# Patient Record
Sex: FEMALE | Race: BLACK OR AFRICAN AMERICAN | URBAN - METROPOLITAN AREA
[De-identification: names, ages, dates, MRNs, and addresses within clinical notes are randomized per-mention and may not be internally consistent; named-entity substitution may affect disease eponyms.]

---

## 2017-03-10 ENCOUNTER — EMERGENCY (EMERGENCY)
Facility: HOSPITAL | Age: 22
LOS: 1 days | End: 2017-03-10
Payer: MEDICAID

## 2017-03-10 PROCEDURE — 99284 EMERGENCY DEPT VISIT MOD MDM: CPT

## 2017-05-13 ENCOUNTER — EMERGENCY (EMERGENCY)
Facility: HOSPITAL | Age: 22
LOS: 1 days | End: 2017-05-13
Payer: MEDICAID

## 2017-05-13 PROCEDURE — 99284 EMERGENCY DEPT VISIT MOD MDM: CPT

## 2017-05-14 PROCEDURE — 71020: CPT | Mod: 26

## 2017-05-15 ENCOUNTER — EMERGENCY (EMERGENCY)
Facility: HOSPITAL | Age: 22
LOS: 1 days | End: 2017-05-15
Payer: MEDICAID

## 2017-05-15 PROCEDURE — 99284 EMERGENCY DEPT VISIT MOD MDM: CPT

## 2017-05-18 ENCOUNTER — EMERGENCY (EMERGENCY)
Facility: HOSPITAL | Age: 22
LOS: 1 days | End: 2017-05-18
Payer: MEDICAID

## 2017-05-18 PROCEDURE — 99283 EMERGENCY DEPT VISIT LOW MDM: CPT

## 2017-05-31 ENCOUNTER — EMERGENCY (EMERGENCY)
Facility: HOSPITAL | Age: 22
LOS: 1 days | End: 2017-05-31
Payer: MEDICAID

## 2017-05-31 PROCEDURE — 99283 EMERGENCY DEPT VISIT LOW MDM: CPT

## 2017-06-01 ENCOUNTER — EMERGENCY (EMERGENCY)
Facility: HOSPITAL | Age: 22
LOS: 1 days | End: 2017-06-01
Payer: MEDICAID

## 2017-06-01 PROCEDURE — 71020: CPT | Mod: 26

## 2017-06-01 PROCEDURE — 99284 EMERGENCY DEPT VISIT MOD MDM: CPT

## 2017-06-08 ENCOUNTER — EMERGENCY (EMERGENCY)
Facility: HOSPITAL | Age: 22
LOS: 1 days | End: 2017-06-08
Payer: MEDICAID

## 2017-06-08 PROCEDURE — 71020: CPT | Mod: 26

## 2017-06-08 PROCEDURE — 99284 EMERGENCY DEPT VISIT MOD MDM: CPT

## 2017-07-01 ENCOUNTER — EMERGENCY (EMERGENCY)
Facility: HOSPITAL | Age: 22
LOS: 1 days | End: 2017-07-01
Payer: MEDICAID

## 2017-07-01 PROCEDURE — 99284 EMERGENCY DEPT VISIT MOD MDM: CPT

## 2017-07-01 PROCEDURE — 71020: CPT | Mod: 26

## 2017-08-04 ENCOUNTER — EMERGENCY (EMERGENCY)
Facility: HOSPITAL | Age: 22
LOS: 1 days | End: 2017-08-04
Payer: MEDICAID

## 2017-08-04 PROCEDURE — 99283 EMERGENCY DEPT VISIT LOW MDM: CPT

## 2017-09-05 ENCOUNTER — EMERGENCY (EMERGENCY)
Facility: HOSPITAL | Age: 22
LOS: 1 days | End: 2017-09-05
Payer: MEDICAID

## 2017-09-05 PROCEDURE — 72050 X-RAY EXAM NECK SPINE 4/5VWS: CPT | Mod: 26

## 2017-09-05 PROCEDURE — 99284 EMERGENCY DEPT VISIT MOD MDM: CPT

## 2017-09-20 ENCOUNTER — EMERGENCY (EMERGENCY)
Facility: HOSPITAL | Age: 22
LOS: 1 days | End: 2017-09-20
Payer: MEDICAID

## 2017-09-20 PROCEDURE — 99282 EMERGENCY DEPT VISIT SF MDM: CPT

## 2017-10-03 ENCOUNTER — EMERGENCY (EMERGENCY)
Facility: HOSPITAL | Age: 22
LOS: 1 days | End: 2017-10-03
Payer: MEDICAID

## 2017-10-03 PROCEDURE — 99284 EMERGENCY DEPT VISIT MOD MDM: CPT | Mod: 25

## 2017-10-18 ENCOUNTER — EMERGENCY (EMERGENCY)
Facility: HOSPITAL | Age: 22
LOS: 1 days | Discharge: HOME | End: 2017-10-18
Attending: EMERGENCY MEDICINE
Payer: MEDICAID

## 2017-10-18 VITALS
TEMPERATURE: 99 F | HEIGHT: 67 IN | RESPIRATION RATE: 16 BRPM | SYSTOLIC BLOOD PRESSURE: 144 MMHG | HEART RATE: 94 BPM | OXYGEN SATURATION: 97 % | DIASTOLIC BLOOD PRESSURE: 86 MMHG | WEIGHT: 199.96 LBS

## 2017-10-18 DIAGNOSIS — F25.0 SCHIZOAFFECTIVE DISORDER, BIPOLAR TYPE: ICD-10-CM

## 2017-10-18 LAB
ALBUMIN SERPL ELPH-MCNC: 4 G/DL — SIGNIFICANT CHANGE UP (ref 3.3–5.2)
ALP SERPL-CCNC: 58 U/L — SIGNIFICANT CHANGE UP (ref 40–120)
ALT FLD-CCNC: 10 U/L — SIGNIFICANT CHANGE UP
AMPHET UR-MCNC: NEGATIVE — SIGNIFICANT CHANGE UP
ANION GAP SERPL CALC-SCNC: 12 MMOL/L — SIGNIFICANT CHANGE UP (ref 5–17)
APAP SERPL-MCNC: <7.5 UG/ML — LOW (ref 10–26)
APPEARANCE UR: CLEAR — SIGNIFICANT CHANGE UP
AST SERPL-CCNC: 19 U/L — SIGNIFICANT CHANGE UP
BACTERIA # UR AUTO: ABNORMAL
BARBITURATES UR SCN-MCNC: NEGATIVE — SIGNIFICANT CHANGE UP
BASOPHILS NFR BLD AUTO: 1 % — SIGNIFICANT CHANGE UP (ref 0–2)
BENZODIAZ UR-MCNC: NEGATIVE — SIGNIFICANT CHANGE UP
BILIRUB SERPL-MCNC: 0.2 MG/DL — LOW (ref 0.4–2)
BILIRUB UR-MCNC: ABNORMAL
BUN SERPL-MCNC: 9 MG/DL — SIGNIFICANT CHANGE UP (ref 8–20)
CALCIUM SERPL-MCNC: 9.2 MG/DL — SIGNIFICANT CHANGE UP (ref 8.6–10.2)
CHLORIDE SERPL-SCNC: 102 MMOL/L — SIGNIFICANT CHANGE UP (ref 98–107)
CO2 SERPL-SCNC: 24 MMOL/L — SIGNIFICANT CHANGE UP (ref 22–29)
COCAINE METAB.OTHER UR-MCNC: NEGATIVE — SIGNIFICANT CHANGE UP
COD CRY URNS QL: ABNORMAL
COLOR SPEC: YELLOW — SIGNIFICANT CHANGE UP
CREAT SERPL-MCNC: 0.91 MG/DL — SIGNIFICANT CHANGE UP (ref 0.5–1.3)
DIFF PNL FLD: NEGATIVE — SIGNIFICANT CHANGE UP
EOSINOPHIL NFR BLD AUTO: 1 % — SIGNIFICANT CHANGE UP (ref 0–5)
EPI CELLS # UR: SIGNIFICANT CHANGE UP
ETHANOL SERPL-MCNC: <10 MG/DL — SIGNIFICANT CHANGE UP
GLUCOSE SERPL-MCNC: 89 MG/DL — SIGNIFICANT CHANGE UP (ref 70–115)
GLUCOSE UR QL: NEGATIVE MG/DL — SIGNIFICANT CHANGE UP
HCT VFR BLD CALC: 39.8 % — SIGNIFICANT CHANGE UP (ref 37–47)
HGB BLD-MCNC: 12.8 G/DL — SIGNIFICANT CHANGE UP (ref 12–16)
KETONES UR-MCNC: ABNORMAL
LEUKOCYTE ESTERASE UR-ACNC: ABNORMAL
LYMPHOCYTES # BLD AUTO: 51 % — SIGNIFICANT CHANGE UP (ref 20–55)
MCHC RBC-ENTMCNC: 28.1 PG — SIGNIFICANT CHANGE UP (ref 27–31)
MCHC RBC-ENTMCNC: 32.2 G/DL — SIGNIFICANT CHANGE UP (ref 32–36)
MCV RBC AUTO: 87.3 FL — SIGNIFICANT CHANGE UP (ref 81–99)
METHADONE UR-MCNC: NEGATIVE — SIGNIFICANT CHANGE UP
MONOCYTES NFR BLD AUTO: 10 % — SIGNIFICANT CHANGE UP (ref 3–10)
NEUTROPHILS NFR BLD AUTO: 37 % — SIGNIFICANT CHANGE UP (ref 37–73)
NITRITE UR-MCNC: NEGATIVE — SIGNIFICANT CHANGE UP
OPIATES UR-MCNC: NEGATIVE — SIGNIFICANT CHANGE UP
PCP SPEC-MCNC: SIGNIFICANT CHANGE UP
PCP UR-MCNC: NEGATIVE — SIGNIFICANT CHANGE UP
PH UR: 6 — SIGNIFICANT CHANGE UP (ref 5–8)
PLAT MORPH BLD: NORMAL — SIGNIFICANT CHANGE UP
PLATELET # BLD AUTO: 194 K/UL — SIGNIFICANT CHANGE UP (ref 150–400)
POTASSIUM SERPL-MCNC: 3.7 MMOL/L — SIGNIFICANT CHANGE UP (ref 3.5–5.3)
POTASSIUM SERPL-SCNC: 3.7 MMOL/L — SIGNIFICANT CHANGE UP (ref 3.5–5.3)
PROT SERPL-MCNC: 7.6 G/DL — SIGNIFICANT CHANGE UP (ref 6.6–8.7)
PROT UR-MCNC: 30 MG/DL
RBC # BLD: 4.56 M/UL — SIGNIFICANT CHANGE UP (ref 4.4–5.2)
RBC # FLD: 13.6 % — SIGNIFICANT CHANGE UP (ref 11–15.6)
RBC BLD AUTO: NORMAL — SIGNIFICANT CHANGE UP
RBC CASTS # UR COMP ASSIST: SIGNIFICANT CHANGE UP /HPF (ref 0–4)
SALICYLATES SERPL-MCNC: <2 MG/DL — LOW (ref 10–20)
SODIUM SERPL-SCNC: 138 MMOL/L — SIGNIFICANT CHANGE UP (ref 135–145)
SP GR SPEC: 1.02 — SIGNIFICANT CHANGE UP (ref 1.01–1.02)
THC UR QL: NEGATIVE — SIGNIFICANT CHANGE UP
UROBILINOGEN FLD QL: 4 MG/DL
WBC # BLD: 10.4 K/UL — SIGNIFICANT CHANGE UP (ref 4.8–10.8)
WBC # FLD AUTO: 10.4 K/UL — SIGNIFICANT CHANGE UP (ref 4.8–10.8)
WBC UR QL: SIGNIFICANT CHANGE UP

## 2017-10-18 PROCEDURE — 93010 ELECTROCARDIOGRAM REPORT: CPT

## 2017-10-18 PROCEDURE — 90792 PSYCH DIAG EVAL W/MED SRVCS: CPT

## 2017-10-18 PROCEDURE — 99283 EMERGENCY DEPT VISIT LOW MDM: CPT

## 2017-10-18 RX ORDER — HALOPERIDOL DECANOATE 100 MG/ML
5 INJECTION INTRAMUSCULAR EVERY 6 HOURS
Qty: 0 | Refills: 0 | Status: DISCONTINUED | OUTPATIENT
Start: 2017-10-18 | End: 2017-10-22

## 2017-10-18 RX ORDER — DIPHENHYDRAMINE HCL 50 MG
50 CAPSULE ORAL EVERY 6 HOURS
Qty: 0 | Refills: 0 | Status: DISCONTINUED | OUTPATIENT
Start: 2017-10-18 | End: 2017-10-22

## 2017-10-18 RX ORDER — DIVALPROEX SODIUM 500 MG/1
500 TABLET, DELAYED RELEASE ORAL
Qty: 0 | Refills: 0 | Status: DISCONTINUED | OUTPATIENT
Start: 2017-10-18 | End: 2017-10-22

## 2017-10-18 RX ORDER — TOPIRAMATE 25 MG
50 TABLET ORAL AT BEDTIME
Qty: 0 | Refills: 0 | Status: DISCONTINUED | OUTPATIENT
Start: 2017-10-18 | End: 2017-10-22

## 2017-10-18 RX ADMIN — Medication 50 MILLIGRAM(S): at 21:34

## 2017-10-18 RX ADMIN — DIVALPROEX SODIUM 500 MILLIGRAM(S): 500 TABLET, DELAYED RELEASE ORAL at 21:34
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
## 2017-10-18 NOTE — ED BEHAVIORAL HEALTH ASSESSMENT NOTE - REASON
patient displayed agitation upon arrival however has since been calm and cooperative in ED after verbal redirection, denies SI /HI does not appear acutely psychotic, mood dysregulation displayed likely due to lack of compliance with medications, unable to confirm decanoate shot but will restart depakote/topiramate, patient also self discharged self from group home and cannot return , would attempt to clarify decanoate dose and administer tomorrow patient displayed agitation upon arrival however has since been calm and cooperative in ED after verbal redirection, denies SI /HI does not appear acutely psychotic, mood dysregulation displayed likely due to lack of compliance with medications, unable to confirm decanoate shot but will restart depakote/topiramate, patient also self discharged self from group home and cannot return, could not reach mother to ask if she can house pt, would attempt to clarify decanoate dose and administer tomorrow
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
## 2017-10-18 NOTE — ED STATDOCS - PHYSICAL EXAMINATION
Psych: Withdrawn. Seems to be distracted by internal stimuli. Depressed mood. Biting self during exam.

## 2017-10-18 NOTE — ED ADULT NURSE NOTE - DISCHARGE TEACHING
return to local ED if symptoms worsen or thoughts to harm self or others develop return to local ED or call 91/EMS if symptoms worsen or thoughts to harm self or others develop
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
## 2017-10-19 VITALS
SYSTOLIC BLOOD PRESSURE: 111 MMHG | DIASTOLIC BLOOD PRESSURE: 74 MMHG | HEART RATE: 88 BPM | RESPIRATION RATE: 18 BRPM | TEMPERATURE: 99 F

## 2017-10-19 PROCEDURE — 80307 DRUG TEST PRSMV CHEM ANLYZR: CPT

## 2017-10-19 PROCEDURE — 85027 COMPLETE CBC AUTOMATED: CPT

## 2017-10-19 PROCEDURE — 36415 COLL VENOUS BLD VENIPUNCTURE: CPT

## 2017-10-19 PROCEDURE — 80053 COMPREHEN METABOLIC PANEL: CPT

## 2017-10-19 PROCEDURE — 99284 EMERGENCY DEPT VISIT MOD MDM: CPT | Mod: 25

## 2017-10-19 PROCEDURE — 81001 URINALYSIS AUTO W/SCOPE: CPT

## 2017-10-19 PROCEDURE — 96372 THER/PROPH/DIAG INJ SC/IM: CPT

## 2017-10-19 PROCEDURE — 93005 ELECTROCARDIOGRAM TRACING: CPT

## 2017-10-19 RX ORDER — HALOPERIDOL DECANOATE 100 MG/ML
200 INJECTION INTRAMUSCULAR ONCE
Qty: 0 | Refills: 0 | Status: COMPLETED | OUTPATIENT
Start: 2017-10-19 | End: 2017-10-19

## 2017-10-19 RX ORDER — HALOPERIDOL DECANOATE 100 MG/ML
1 INJECTION INTRAMUSCULAR
Qty: 0 | Refills: 0 | COMMUNITY

## 2017-10-19 RX ADMIN — DIVALPROEX SODIUM 500 MILLIGRAM(S): 500 TABLET, DELAYED RELEASE ORAL at 06:08

## 2017-10-19 RX ADMIN — HALOPERIDOL DECANOATE 200 MILLIGRAM(S): 100 INJECTION INTRAMUSCULAR at 11:13

## 2017-10-19 NOTE — ED BEHAVIORAL HEALTH NOTE - BEHAVIORAL HEALTH NOTE
information received from [facility] indicating patient overdue for her Haldol decanoate 200 mg IM last given 9/11/17 She is agreeable to take that medication today  Patient is denying active suicidal or violent urges She is presently calm and cooperative Ms Holguin from  has been in contact with [facility] and [facility] attempting to explore options for patient who discharged from her group home when she left for [place]. Currently not exhibiting symptoms that would benefit from acute level of care.

## 2017-10-19 NOTE — ED ADULT NURSE REASSESSMENT NOTE - COMFORT CARE
repositioned/darkened lights
repositioned/darkened lights/po fluids offered/warm blanket provided
plan of care explained
plan of care explained/darkened lights

## 2017-10-19 NOTE — ED PROVIDER NOTE - CARE PLAN
Principal Discharge DX:	Bipolar disorder  Secondary Diagnosis:	Schizoaffective disorder, bipolar type

## 2017-10-19 NOTE — ED ADULT NURSE REASSESSMENT NOTE - NS ED NURSE REASSESS COMMENT FT1
Pt currently resting/sleeping comfortably. Pt in no apparent distress. Safety maintained. Will continue to monitor the pt for safety.
Assumed care of pt @2300. Pt denies any current SI/HI. pt currently resting/sleeping comfortably. Will monitor the pt for safety.
Assumed care at 0730.  Patient resting in bed, mood is subdued, no attempts to harm self or others, and safety maintained.
Patient mood is subdued, denies suicidal or homicidal ideation, no psychotic symptoms, verbalizing she is motivated to continue treatment in outpatient setting.  Agrees with plan related to pending discharge.  Complaint with ordered medication of Haldol dec 200mg IM given in the right deltoid.

## 2017-10-19 NOTE — ED BEHAVIORAL HEALTH NOTE - BEHAVIORAL HEALTH NOTE
SW Note: Pt was cleared by psychiatry for discharge. Pt is currently homeless. She signed out of her group home, [facility], 10/13/17. Spoke with Emiliano Holguin  from [facility] [phone]. He has no available housing options for pt. he has already reached out to [facility] for potential [facility] housing placement. Crisis residence has no open beds at this time. The pt has no family to reside with. Mr. Holguin aware pt has been cleared and will need to request shelter placement at [facility]. [facility] had no available workers to  pt to bring her to [facility]. Taxi voucher provided. Mr. Holguin requested that pt was given his office number for any issues. pt has her own cellphone. # provided to pt.   Also spoke with Therapist from [facility]/p  clinic, Yoon [phone]. She is aware of the plan. Med sheet faxed prior to discharge and pt was given her Haldol Dec shot which was overdue. Pt signed consent form and psych eval and  [facility] med sheet faxed for continued care.

## 2017-10-19 NOTE — ED ADULT NURSE REASSESSMENT NOTE - GENERAL PATIENT STATE
resting/sleeping/no change observed/comfortable appearance
cooperative/comfortable appearance
resting/sleeping/comfortable appearance/cooperative
resting/sleeping/comfortable appearance/cooperative

## 2017-10-22 ENCOUNTER — EMERGENCY (EMERGENCY)
Facility: HOSPITAL | Age: 22
LOS: 1 days | End: 2017-10-22
Payer: MEDICAID

## 2017-10-22 PROCEDURE — 99284 EMERGENCY DEPT VISIT MOD MDM: CPT

## 2017-10-23 ENCOUNTER — EMERGENCY (EMERGENCY)
Facility: HOSPITAL | Age: 22
LOS: 1 days | Discharge: OTHER INSTITUTION | End: 2017-10-23
Attending: EMERGENCY MEDICINE
Payer: MEDICAID

## 2017-10-23 ENCOUNTER — INPATIENT (INPATIENT)
Facility: HOSPITAL | Age: 22
LOS: 21 days | Discharge: HOME | End: 2017-11-14
Attending: PSYCHIATRY & NEUROLOGY | Admitting: PSYCHIATRY & NEUROLOGY
Payer: MEDICAID

## 2017-10-23 VITALS
DIASTOLIC BLOOD PRESSURE: 88 MMHG | RESPIRATION RATE: 18 BRPM | SYSTOLIC BLOOD PRESSURE: 128 MMHG | TEMPERATURE: 98 F | HEART RATE: 95 BPM | OXYGEN SATURATION: 99 %

## 2017-10-23 VITALS
TEMPERATURE: 99 F | WEIGHT: 197.09 LBS | DIASTOLIC BLOOD PRESSURE: 75 MMHG | OXYGEN SATURATION: 98 % | RESPIRATION RATE: 20 BRPM | HEART RATE: 90 BPM | SYSTOLIC BLOOD PRESSURE: 126 MMHG

## 2017-10-23 VITALS — HEIGHT: 67 IN | WEIGHT: 186.95 LBS | TEMPERATURE: 99 F

## 2017-10-23 DIAGNOSIS — F32.9 MAJOR DEPRESSIVE DISORDER, SINGLE EPISODE, UNSPECIFIED: ICD-10-CM

## 2017-10-23 DIAGNOSIS — R41.83 BORDERLINE INTELLECTUAL FUNCTIONING: ICD-10-CM

## 2017-10-23 LAB
ALBUMIN SERPL ELPH-MCNC: 4 G/DL — SIGNIFICANT CHANGE UP (ref 3.3–5.2)
ALP SERPL-CCNC: 63 U/L — SIGNIFICANT CHANGE UP (ref 40–120)
ALT FLD-CCNC: 8 U/L — SIGNIFICANT CHANGE UP
AMPHET UR-MCNC: NEGATIVE — SIGNIFICANT CHANGE UP
ANION GAP SERPL CALC-SCNC: 19 MMOL/L — HIGH (ref 5–17)
ANISOCYTOSIS BLD QL: SLIGHT — SIGNIFICANT CHANGE UP
APAP SERPL-MCNC: <7.5 UG/ML — LOW (ref 10–26)
APPEARANCE UR: CLEAR — SIGNIFICANT CHANGE UP
AST SERPL-CCNC: 18 U/L — SIGNIFICANT CHANGE UP
BARBITURATES UR SCN-MCNC: NEGATIVE — SIGNIFICANT CHANGE UP
BASOPHILS NFR BLD AUTO: 2 % — SIGNIFICANT CHANGE UP (ref 0–2)
BENZODIAZ UR-MCNC: NEGATIVE — SIGNIFICANT CHANGE UP
BILIRUB SERPL-MCNC: 0.2 MG/DL — LOW (ref 0.4–2)
BILIRUB UR-MCNC: NEGATIVE — SIGNIFICANT CHANGE UP
BUN SERPL-MCNC: 7 MG/DL — LOW (ref 8–20)
BURR CELLS BLD QL SMEAR: PRESENT — SIGNIFICANT CHANGE UP
CALCIUM SERPL-MCNC: 9.8 MG/DL — SIGNIFICANT CHANGE UP (ref 8.6–10.2)
CHLORIDE SERPL-SCNC: 104 MMOL/L — SIGNIFICANT CHANGE UP (ref 98–107)
CO2 SERPL-SCNC: 20 MMOL/L — LOW (ref 22–29)
COCAINE METAB.OTHER UR-MCNC: NEGATIVE — SIGNIFICANT CHANGE UP
COLOR SPEC: YELLOW — SIGNIFICANT CHANGE UP
CREAT SERPL-MCNC: 0.97 MG/DL — SIGNIFICANT CHANGE UP (ref 0.5–1.3)
DACRYOCYTES BLD QL SMEAR: SLIGHT — SIGNIFICANT CHANGE UP
DIFF PNL FLD: NEGATIVE — SIGNIFICANT CHANGE UP
EPI CELLS # UR: SIGNIFICANT CHANGE UP
ETHANOL SERPL-MCNC: <10 MG/DL — SIGNIFICANT CHANGE UP
GLUCOSE SERPL-MCNC: 107 MG/DL — SIGNIFICANT CHANGE UP (ref 70–115)
GLUCOSE UR QL: NEGATIVE MG/DL — SIGNIFICANT CHANGE UP
HCG UR QL: NEGATIVE — SIGNIFICANT CHANGE UP
HCT VFR BLD CALC: 41.2 % — SIGNIFICANT CHANGE UP (ref 37–47)
HGB BLD-MCNC: 13.3 G/DL — SIGNIFICANT CHANGE UP (ref 12–16)
KETONES UR-MCNC: ABNORMAL
LEUKOCYTE ESTERASE UR-ACNC: ABNORMAL
LYMPHOCYTES # BLD AUTO: 44 % — SIGNIFICANT CHANGE UP (ref 20–55)
MACROCYTES BLD QL: SLIGHT — SIGNIFICANT CHANGE UP
MCHC RBC-ENTMCNC: 28.4 PG — SIGNIFICANT CHANGE UP (ref 27–31)
MCHC RBC-ENTMCNC: 32.3 G/DL — SIGNIFICANT CHANGE UP (ref 32–36)
MCV RBC AUTO: 88 FL — SIGNIFICANT CHANGE UP (ref 81–99)
METHADONE UR-MCNC: NEGATIVE — SIGNIFICANT CHANGE UP
MONOCYTES NFR BLD AUTO: 12 % — HIGH (ref 3–10)
NEUTROPHILS NFR BLD AUTO: 41 % — SIGNIFICANT CHANGE UP (ref 37–73)
NITRITE UR-MCNC: NEGATIVE — SIGNIFICANT CHANGE UP
OPIATES UR-MCNC: NEGATIVE — SIGNIFICANT CHANGE UP
PCP SPEC-MCNC: SIGNIFICANT CHANGE UP
PCP UR-MCNC: NEGATIVE — SIGNIFICANT CHANGE UP
PH UR: 6 — SIGNIFICANT CHANGE UP (ref 5–8)
PLAT MORPH BLD: NORMAL — SIGNIFICANT CHANGE UP
PLATELET # BLD AUTO: 264 K/UL — SIGNIFICANT CHANGE UP (ref 150–400)
POIKILOCYTOSIS BLD QL AUTO: SLIGHT — SIGNIFICANT CHANGE UP
POTASSIUM SERPL-MCNC: 4.2 MMOL/L — SIGNIFICANT CHANGE UP (ref 3.5–5.3)
POTASSIUM SERPL-SCNC: 4.2 MMOL/L — SIGNIFICANT CHANGE UP (ref 3.5–5.3)
PROT SERPL-MCNC: 8.1 G/DL — SIGNIFICANT CHANGE UP (ref 6.6–8.7)
PROT UR-MCNC: 15 MG/DL
RBC # BLD: 4.68 M/UL — SIGNIFICANT CHANGE UP (ref 4.4–5.2)
RBC # FLD: 13.7 % — SIGNIFICANT CHANGE UP (ref 11–15.6)
RBC BLD AUTO: ABNORMAL
SALICYLATES SERPL-MCNC: <2 MG/DL — LOW (ref 10–20)
SODIUM SERPL-SCNC: 143 MMOL/L — SIGNIFICANT CHANGE UP (ref 135–145)
SP GR SPEC: 1.03 — HIGH (ref 1.01–1.02)
THC UR QL: NEGATIVE — SIGNIFICANT CHANGE UP
UROBILINOGEN FLD QL: 4 MG/DL
VARIANT LYMPHS # BLD: 1 % — SIGNIFICANT CHANGE UP (ref 0–6)
WBC # BLD: 12 K/UL — HIGH (ref 4.8–10.8)
WBC # FLD AUTO: 12 K/UL — HIGH (ref 4.8–10.8)
WBC UR QL: SIGNIFICANT CHANGE UP

## 2017-10-23 PROCEDURE — 96372 THER/PROPH/DIAG INJ SC/IM: CPT

## 2017-10-23 PROCEDURE — 81001 URINALYSIS AUTO W/SCOPE: CPT

## 2017-10-23 PROCEDURE — 93010 ELECTROCARDIOGRAM REPORT: CPT

## 2017-10-23 PROCEDURE — 99285 EMERGENCY DEPT VISIT HI MDM: CPT

## 2017-10-23 PROCEDURE — 80053 COMPREHEN METABOLIC PANEL: CPT

## 2017-10-23 PROCEDURE — 99285 EMERGENCY DEPT VISIT HI MDM: CPT | Mod: 25

## 2017-10-23 PROCEDURE — 81025 URINE PREGNANCY TEST: CPT

## 2017-10-23 PROCEDURE — 93005 ELECTROCARDIOGRAM TRACING: CPT

## 2017-10-23 PROCEDURE — 85027 COMPLETE CBC AUTOMATED: CPT

## 2017-10-23 PROCEDURE — 36415 COLL VENOUS BLD VENIPUNCTURE: CPT

## 2017-10-23 PROCEDURE — 80307 DRUG TEST PRSMV CHEM ANLYZR: CPT

## 2017-10-23 RX ORDER — HALOPERIDOL DECANOATE 100 MG/ML
5 INJECTION INTRAMUSCULAR EVERY 4 HOURS
Qty: 0 | Refills: 0 | Status: DISCONTINUED | OUTPATIENT
Start: 2017-10-23 | End: 2017-11-14

## 2017-10-23 RX ORDER — METFORMIN HYDROCHLORIDE 850 MG/1
1500 TABLET ORAL DAILY
Qty: 0 | Refills: 0 | Status: DISCONTINUED | OUTPATIENT
Start: 2017-10-23 | End: 2017-11-14

## 2017-10-23 RX ORDER — HALOPERIDOL DECANOATE 100 MG/ML
5 INJECTION INTRAMUSCULAR ONCE
Qty: 0 | Refills: 0 | Status: COMPLETED | OUTPATIENT
Start: 2017-10-23 | End: 2017-10-23

## 2017-10-23 RX ORDER — DIPHENHYDRAMINE HCL 50 MG
50 CAPSULE ORAL EVERY 4 HOURS
Qty: 0 | Refills: 0 | Status: DISCONTINUED | OUTPATIENT
Start: 2017-10-23 | End: 2017-11-14

## 2017-10-23 RX ORDER — HALOPERIDOL DECANOATE 100 MG/ML
10 INJECTION INTRAMUSCULAR AT BEDTIME
Qty: 0 | Refills: 0 | Status: DISCONTINUED | OUTPATIENT
Start: 2017-10-23 | End: 2017-10-26

## 2017-10-23 RX ORDER — ALBUTEROL 90 UG/1
2 AEROSOL, METERED ORAL EVERY 6 HOURS
Qty: 0 | Refills: 0 | Status: DISCONTINUED | OUTPATIENT
Start: 2017-10-23 | End: 2017-11-14

## 2017-10-23 RX ORDER — DIVALPROEX SODIUM 500 MG/1
1000 TABLET, DELAYED RELEASE ORAL AT BEDTIME
Qty: 0 | Refills: 0 | Status: DISCONTINUED | OUTPATIENT
Start: 2017-10-23 | End: 2017-10-24

## 2017-10-23 RX ORDER — TOPIRAMATE 25 MG
200 TABLET ORAL
Qty: 0 | Refills: 0 | Status: DISCONTINUED | OUTPATIENT
Start: 2017-10-23 | End: 2017-11-14

## 2017-10-23 RX ORDER — PROPRANOLOL HCL 160 MG
20 CAPSULE, EXTENDED RELEASE 24HR ORAL THREE TIMES A DAY
Qty: 0 | Refills: 0 | Status: DISCONTINUED | OUTPATIENT
Start: 2017-10-23 | End: 2017-11-14

## 2017-10-23 RX ORDER — HALOPERIDOL DECANOATE 100 MG/ML
5 INJECTION INTRAMUSCULAR ONCE
Qty: 0 | Refills: 0 | Status: DISCONTINUED | OUTPATIENT
Start: 2017-10-23 | End: 2017-11-14

## 2017-10-23 RX ADMIN — Medication 2 MILLIGRAM(S): at 00:40

## 2017-10-23 RX ADMIN — HALOPERIDOL DECANOATE 5 MILLIGRAM(S): 100 INJECTION INTRAMUSCULAR at 00:40

## 2017-10-23 NOTE — ED BEHAVIORAL HEALTH ASSESSMENT NOTE - DESCRIPTION
initially combative   now much calmer  Vital Signs Last 24 Hrs  T(C): 36.9 (23 Oct 2017 08:00), Max: 37.2 (23 Oct 2017 00:18)  T(F): 98.4 (23 Oct 2017 08:00), Max: 98.9 (23 Oct 2017 00:18)  HR: 91 (23 Oct 2017 08:00) (90 - 98)  BP: 108/65 (23 Oct 2017 08:00) (97/59 - 126/75)  BP(mean): --  RR: 20 (23 Oct 2017 08:00) (16 - 20)  SpO2: 98% (23 Oct 2017 08:00) (98% - 99%) asthma HTN lived in group home, multiple psychiatric hospitalizations
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
## 2017-10-23 NOTE — ED BEHAVIORAL HEALTH ASSESSMENT NOTE - SUMMARY
21 year old female with history of mental illness and mild intellectual disability non compliant with psych meds, suicidal threats and aggressive behaviors with poor impulse control Patient a danger to self and others In need of inpatient stabilization

## 2017-10-23 NOTE — ED BEHAVIORAL HEALTH NOTE - BEHAVIORAL HEALTH NOTE
SW Note: pt will be transferred to [facility] for inpt psychiatric tx. Accepting MD, Dr. Michelle. 9.37 legals completed. [facility], [place], [place] and [place] did not have any available beds. Ambulance arranged with [facility]. Ins precert completed. Spoke with Alexandra from [employer] [phone] x [phone]. Auth approved for 4 days 10/23/17 -10/26/17. Auth# [id number]. Review due with Alexandra on 10/26. Info forwarded to [facility] UR dept.   Also left msg's for her therapist at [facility], Yoon León [phone] and her  from [facility] [phone].
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
## 2017-10-23 NOTE — ED ADULT NURSE REASSESSMENT NOTE - NS ED NURSE REASSESS COMMENT FT1
Pt currently sleeping offers no complaints at this present time, pt sedated but arousal, will continue to monitor and maintain safety.
Pt brought to , after she was medicated in the main after being physically combative with staff.  Pt currently sedated, brought to  with security and ANM (M.P.), pt stated she was suicidal wanted to jump in front of a train due to problems she is having, did not disclose any further due to sedation.  Pt integrity maintained, security present, currently no aggressive /assaultive behaviors at this time.  PO fluids offered, tolerated well, snacks offered.    Pt continues to have IV lock to left hand, as per ANM, (M.P), at this time patient will remain with IV lock on as per (M.P), until further reassessment due patients behavior. Will continue to monitor and maintain safety.
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
## 2017-10-23 NOTE — ED PROVIDER NOTE - PHYSICAL EXAMINATION
During the interview the pt is scratching her left wrist and prying bracelet off her wrist. pt is attempting to cut herself with bracelet.

## 2017-10-23 NOTE — ED ADULT NURSE REASSESSMENT NOTE - CONDITION
Pt calmer now, eating and interacting with peers. Medicated with Oxycodone , tylenol and Lyrica. Pt also given artificial tears. Pt continues to be very needy. Is walking better, is friendly and cooperative.  Continues to have difficulty with intensity of light/unchanged Pt calmer now, up for dinner. Interacting with peers, Pt states she wants to change her life and have a small hose. t is hopeful time  at [facility] is a positive one. Pt ambulates to restroom with out difficulty/unchanged

## 2017-10-23 NOTE — ED ADULT NURSE REASSESSMENT NOTE - COMFORT CARE
po fluids offered/warm blanket provided/darkened lights
ambulated to bathroom
plan of care explained/po fluids offered/meal provided
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
## 2017-10-23 NOTE — ED ADULT NURSE NOTE - OBJECTIVE STATEMENT
Pt brought to , stating she was having suicidal thoughts, and plans to throw herself in front of train, pt was medicated in [facility] ED due to combative behavior.
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
## 2017-10-24 PROCEDURE — 90853 GROUP PSYCHOTHERAPY: CPT

## 2017-10-24 PROCEDURE — 99232 SBSQ HOSP IP/OBS MODERATE 35: CPT | Mod: 25

## 2017-10-24 RX ORDER — DIVALPROEX SODIUM 500 MG/1
1000 TABLET, DELAYED RELEASE ORAL AT BEDTIME
Qty: 0 | Refills: 0 | Status: DISCONTINUED | OUTPATIENT
Start: 2017-10-24 | End: 2017-11-14

## 2017-10-24 RX ORDER — DIVALPROEX SODIUM 500 MG/1
500 TABLET, DELAYED RELEASE ORAL AT BEDTIME
Qty: 0 | Refills: 0 | Status: DISCONTINUED | OUTPATIENT
Start: 2017-10-24 | End: 2017-10-24

## 2017-10-24 RX ADMIN — Medication 200 MILLIGRAM(S): at 10:03

## 2017-10-24 RX ADMIN — Medication 20 MILLIGRAM(S): at 13:23

## 2017-10-24 RX ADMIN — Medication 2 MILLIGRAM(S): at 23:10

## 2017-10-24 RX ADMIN — Medication 2 MILLIGRAM(S): at 13:24

## 2017-10-24 RX ADMIN — Medication 20 MILLIGRAM(S): at 21:44

## 2017-10-24 RX ADMIN — Medication 20 MILLIGRAM(S): at 10:03

## 2017-10-24 RX ADMIN — METFORMIN HYDROCHLORIDE 1500 MILLIGRAM(S): 850 TABLET ORAL at 10:03

## 2017-10-24 RX ADMIN — HALOPERIDOL DECANOATE 5 MILLIGRAM(S): 100 INJECTION INTRAMUSCULAR at 18:59

## 2017-10-24 RX ADMIN — HALOPERIDOL DECANOATE 10 MILLIGRAM(S): 100 INJECTION INTRAMUSCULAR at 21:44

## 2017-10-24 RX ADMIN — Medication 50 MILLIGRAM(S): at 18:58

## 2017-10-24 RX ADMIN — Medication 2 MILLIGRAM(S): at 18:59

## 2017-10-24 RX ADMIN — DIVALPROEX SODIUM 1000 MILLIGRAM(S): 500 TABLET, DELAYED RELEASE ORAL at 21:44

## 2017-10-24 NOTE — CHART NOTE - NSCHARTNOTEFT_GEN_A_CORE
Screening Medical Evaluation  Patient Admitted from: [facility] admitting diagnosis: Mood disorder NOS, moderate intellectual disabilities    PAST MEDICAL & SURGICAL HISTORY:  Schizophrenia  Bipolar disorder        Allergies    lithium (Unknown)  Thorazine (Rash)    Intolerances        Social History:     FAMILY HISTORY:      MEDICATIONS  (STANDING):  diVALproex DR 1000 milliGRAM(s) Oral at bedtime  haloperidol     Tablet 10 milliGRAM(s) Oral at bedtime  metFORMIN 1500 milliGRAM(s) Oral daily  propranolol 20 milliGRAM(s) Oral three times a day  topiramate 200 milliGRAM(s) Oral two times a day    MEDICATIONS  (PRN):  ALBUTerol    90 MICROgram(s) HFA Inhaler 2 Puff(s) Inhalation every 6 hours PRN Shortness of Breath and/or Wheezing  diphenhydrAMINE   Capsule 50 milliGRAM(s) Oral every 4 hours PRN Extrapyramidal prophylaxis  diphenhydrAMINE   Injectable 50 milliGRAM(s) IntraMuscular every 4 hours PRN Extrapyramidal prophylaxis  haloperidol     Tablet 5 milliGRAM(s) Oral every 4 hours PRN agitation  haloperidol    Injectable 5 milliGRAM(s) IntraMuscular once PRN agitation  LORazepam     Tablet 2 milliGRAM(s) Oral every 4 hours PRN Agitation  LORazepam   Injectable 2 milliGRAM(s) IntraMuscular once PRN Agitation        CAPILLARY BLOOD GLUCOSE        I/O's Summary      PHYSICAL EXAM:  GENERAL: NAD, well-developed  HEAD:  Atraumatic, Normocephalic  EYES: EOMI, PERRLA, conjunctiva and sclera clear  NECK: Supple, No JVD  CHEST/LUNG: Clear to auscultation bilaterally; No wheeze  HEART: Regular rate and rhythm; No murmurs, rubs, or gallops  ABDOMEN: Soft, Nontender, Nondistended; Bowel sounds present  EXTREMITIES:  2+ Peripheral Pulses, No clubbing, cyanosis, or edema  PSYCH: AAOx3  NEUROLOGY: CN 2-12 intact  SKIN: No rashes or lesions    LABS:                        13.3   12.0  )-----------( 264      ( 23 Oct 2017 01:06 )             41.2     10-    143  |  104  |  7.0<L>  ----------------------------<  107  4.2   |  20.0<L>  |  0.97    Ca    9.8      23 Oct 2017 01:06    TPro  8.1  /  Alb  4.0  /  TBili  0.2<L>  /  DBili  x   /  AST  18  /  ALT  8   /  AlkPhos  63  10-23          Urinalysis Basic - ( 23 Oct 2017 13:53 )    Color: Yellow / Appearance: Clear / S.030 / pH: x  Gluc: x / Ketone: Trace  / Bili: Negative / Urobili: 4 mg/dL   Blood: x / Protein: 15 mg/dL / Nitrite: Negative   Leuk Esterase: Small / RBC: x / WBC 0-2   Sq Epi: x / Non Sq Epi: Occasional / Bacteria: x        RADIOLOGY & ADDITIONAL TESTS:    Assessment and Plan: Patient is a 21 year old woman with PMH of asthma (last attack reported at age 8), intellectual disability and past psychiatric history of schizoaffective disorder, prior psychiatric hospitalizations; admitted from [facility] for uncontrolled emotional lability.  Patient denies any medical complaints or concerns.  Denies lightheadedness/dizziness, chest pain, SOB, n/v/d/c.  Her screening physical was unremarkable, labs WNL.    1) SAD: Continue treatment as per primary psychiatric team; Divalproex DR 1000mg PO qhs, Haloperidol 10mg PO qhs  2) Asthma: Albuterol 90mcg HFA inhaler PRN

## 2017-10-25 PROCEDURE — 99232 SBSQ HOSP IP/OBS MODERATE 35: CPT

## 2017-10-25 RX ORDER — QUETIAPINE FUMARATE 200 MG/1
50 TABLET, FILM COATED ORAL EVERY 6 HOURS
Qty: 0 | Refills: 0 | Status: DISCONTINUED | OUTPATIENT
Start: 2017-10-25 | End: 2017-11-14

## 2017-10-25 RX ADMIN — HALOPERIDOL DECANOATE 5 MILLIGRAM(S): 100 INJECTION INTRAMUSCULAR at 14:09

## 2017-10-25 RX ADMIN — Medication 20 MILLIGRAM(S): at 13:37

## 2017-10-25 RX ADMIN — Medication 200 MILLIGRAM(S): at 00:10

## 2017-10-25 RX ADMIN — Medication 2 MILLIGRAM(S): at 14:08

## 2017-10-25 RX ADMIN — Medication 50 MILLIGRAM(S): at 14:08

## 2017-10-25 RX ADMIN — Medication 20 MILLIGRAM(S): at 20:28

## 2017-10-25 RX ADMIN — HALOPERIDOL DECANOATE 10 MILLIGRAM(S): 100 INJECTION INTRAMUSCULAR at 20:28

## 2017-10-25 RX ADMIN — DIVALPROEX SODIUM 1000 MILLIGRAM(S): 500 TABLET, DELAYED RELEASE ORAL at 20:28

## 2017-10-25 RX ADMIN — METFORMIN HYDROCHLORIDE 1500 MILLIGRAM(S): 850 TABLET ORAL at 10:17

## 2017-10-25 RX ADMIN — Medication 200 MILLIGRAM(S): at 10:17

## 2017-10-25 RX ADMIN — QUETIAPINE FUMARATE 50 MILLIGRAM(S): 200 TABLET, FILM COATED ORAL at 20:30

## 2017-10-25 RX ADMIN — Medication 1 MILLIGRAM(S): at 20:28

## 2017-10-25 RX ADMIN — Medication 200 MILLIGRAM(S): at 20:28

## 2017-10-25 RX ADMIN — Medication 2 MILLIGRAM(S): at 20:30

## 2017-10-25 RX ADMIN — Medication 20 MILLIGRAM(S): at 10:17

## 2017-10-26 PROCEDURE — 99232 SBSQ HOSP IP/OBS MODERATE 35: CPT

## 2017-10-26 RX ORDER — QUETIAPINE FUMARATE 200 MG/1
50 TABLET, FILM COATED ORAL
Qty: 0 | Refills: 0 | Status: DISCONTINUED | OUTPATIENT
Start: 2017-10-26 | End: 2017-11-14

## 2017-10-26 RX ORDER — HALOPERIDOL DECANOATE 100 MG/ML
5 INJECTION INTRAMUSCULAR AT BEDTIME
Qty: 0 | Refills: 0 | Status: DISCONTINUED | OUTPATIENT
Start: 2017-10-26 | End: 2017-10-28

## 2017-10-26 RX ADMIN — METFORMIN HYDROCHLORIDE 1500 MILLIGRAM(S): 850 TABLET ORAL at 10:47

## 2017-10-26 RX ADMIN — HALOPERIDOL DECANOATE 5 MILLIGRAM(S): 100 INJECTION INTRAMUSCULAR at 21:42

## 2017-10-26 RX ADMIN — Medication 20 MILLIGRAM(S): at 10:47

## 2017-10-26 RX ADMIN — HALOPERIDOL DECANOATE 5 MILLIGRAM(S): 100 INJECTION INTRAMUSCULAR at 14:27

## 2017-10-26 RX ADMIN — Medication 200 MILLIGRAM(S): at 10:47

## 2017-10-26 RX ADMIN — Medication 20 MILLIGRAM(S): at 21:42

## 2017-10-26 RX ADMIN — QUETIAPINE FUMARATE 50 MILLIGRAM(S): 200 TABLET, FILM COATED ORAL at 15:26

## 2017-10-26 RX ADMIN — Medication 1 MILLIGRAM(S): at 10:47

## 2017-10-26 RX ADMIN — DIVALPROEX SODIUM 1000 MILLIGRAM(S): 500 TABLET, DELAYED RELEASE ORAL at 21:42

## 2017-10-26 RX ADMIN — Medication 50 MILLIGRAM(S): at 14:27

## 2017-10-26 RX ADMIN — Medication 200 MILLIGRAM(S): at 21:43

## 2017-10-26 RX ADMIN — Medication 1 MILLIGRAM(S): at 21:42

## 2017-10-26 RX ADMIN — Medication 1 MILLIGRAM(S): at 12:20

## 2017-10-26 RX ADMIN — Medication 20 MILLIGRAM(S): at 12:20

## 2017-10-26 RX ADMIN — Medication 2 MILLIGRAM(S): at 15:25

## 2017-10-27 PROCEDURE — 99233 SBSQ HOSP IP/OBS HIGH 50: CPT

## 2017-10-27 RX ADMIN — Medication 200 MILLIGRAM(S): at 09:30

## 2017-10-27 RX ADMIN — Medication 20 MILLIGRAM(S): at 21:16

## 2017-10-27 RX ADMIN — Medication 50 MILLIGRAM(S): at 22:00

## 2017-10-27 RX ADMIN — Medication 2 MILLIGRAM(S): at 22:00

## 2017-10-27 RX ADMIN — HALOPERIDOL DECANOATE 5 MILLIGRAM(S): 100 INJECTION INTRAMUSCULAR at 21:16

## 2017-10-27 RX ADMIN — Medication 1 MILLIGRAM(S): at 11:30

## 2017-10-27 RX ADMIN — QUETIAPINE FUMARATE 50 MILLIGRAM(S): 200 TABLET, FILM COATED ORAL at 09:30

## 2017-10-27 RX ADMIN — METFORMIN HYDROCHLORIDE 1500 MILLIGRAM(S): 850 TABLET ORAL at 09:30

## 2017-10-27 RX ADMIN — QUETIAPINE FUMARATE 50 MILLIGRAM(S): 200 TABLET, FILM COATED ORAL at 13:04

## 2017-10-27 RX ADMIN — Medication 20 MILLIGRAM(S): at 13:04

## 2017-10-27 RX ADMIN — Medication 200 MILLIGRAM(S): at 21:17

## 2017-10-27 RX ADMIN — Medication 1 MILLIGRAM(S): at 21:16

## 2017-10-27 RX ADMIN — Medication 20 MILLIGRAM(S): at 09:30

## 2017-10-27 RX ADMIN — DIVALPROEX SODIUM 1000 MILLIGRAM(S): 500 TABLET, DELAYED RELEASE ORAL at 21:16

## 2017-10-27 RX ADMIN — Medication 0.5 MILLIGRAM(S): at 13:04

## 2017-10-28 PROCEDURE — 99232 SBSQ HOSP IP/OBS MODERATE 35: CPT

## 2017-10-28 RX ORDER — HALOPERIDOL DECANOATE 100 MG/ML
5 INJECTION INTRAMUSCULAR
Qty: 0 | Refills: 0 | Status: DISCONTINUED | OUTPATIENT
Start: 2017-10-28 | End: 2017-11-14

## 2017-10-28 RX ORDER — HALOPERIDOL DECANOATE 100 MG/ML
5 INJECTION INTRAMUSCULAR ONCE
Qty: 0 | Refills: 0 | Status: COMPLETED | OUTPATIENT
Start: 2017-10-28 | End: 2017-11-05

## 2017-10-28 RX ORDER — NICOTINE POLACRILEX 2 MG
1 GUM BUCCAL
Qty: 0 | Refills: 0 | Status: DISCONTINUED | OUTPATIENT
Start: 2017-10-28 | End: 2017-11-14

## 2017-10-28 RX ADMIN — QUETIAPINE FUMARATE 50 MILLIGRAM(S): 200 TABLET, FILM COATED ORAL at 15:16

## 2017-10-28 RX ADMIN — Medication 2 MILLIGRAM(S): at 23:17

## 2017-10-28 RX ADMIN — METFORMIN HYDROCHLORIDE 1500 MILLIGRAM(S): 850 TABLET ORAL at 09:49

## 2017-10-28 RX ADMIN — Medication 2 MILLIGRAM(S): at 19:05

## 2017-10-28 RX ADMIN — Medication 20 MILLIGRAM(S): at 09:49

## 2017-10-28 RX ADMIN — HALOPERIDOL DECANOATE 5 MILLIGRAM(S): 100 INJECTION INTRAMUSCULAR at 15:17

## 2017-10-28 RX ADMIN — Medication 200 MILLIGRAM(S): at 21:36

## 2017-10-28 RX ADMIN — QUETIAPINE FUMARATE 50 MILLIGRAM(S): 200 TABLET, FILM COATED ORAL at 09:49

## 2017-10-28 RX ADMIN — Medication 20 MILLIGRAM(S): at 15:16

## 2017-10-28 RX ADMIN — HALOPERIDOL DECANOATE 5 MILLIGRAM(S): 100 INJECTION INTRAMUSCULAR at 10:31

## 2017-10-28 RX ADMIN — Medication 50 MILLIGRAM(S): at 15:16

## 2017-10-28 RX ADMIN — Medication 20 MILLIGRAM(S): at 21:36

## 2017-10-28 RX ADMIN — HALOPERIDOL DECANOATE 5 MILLIGRAM(S): 100 INJECTION INTRAMUSCULAR at 19:17

## 2017-10-28 RX ADMIN — DIVALPROEX SODIUM 1000 MILLIGRAM(S): 500 TABLET, DELAYED RELEASE ORAL at 21:36

## 2017-10-28 RX ADMIN — Medication 1 MILLIGRAM(S): at 09:49

## 2017-10-28 RX ADMIN — HALOPERIDOL DECANOATE 5 MILLIGRAM(S): 100 INJECTION INTRAMUSCULAR at 21:36

## 2017-10-28 RX ADMIN — Medication 2 MILLIGRAM(S): at 10:31

## 2017-10-28 RX ADMIN — Medication 50 MILLIGRAM(S): at 10:31

## 2017-10-28 RX ADMIN — Medication 200 MILLIGRAM(S): at 09:49

## 2017-10-28 RX ADMIN — Medication 1 MILLIGRAM(S): at 21:36

## 2017-10-28 RX ADMIN — QUETIAPINE FUMARATE 50 MILLIGRAM(S): 200 TABLET, FILM COATED ORAL at 19:17

## 2017-10-28 RX ADMIN — Medication 0.5 MILLIGRAM(S): at 15:16

## 2017-10-28 RX ADMIN — Medication 50 MILLIGRAM(S): at 19:17

## 2017-10-29 RX ADMIN — METFORMIN HYDROCHLORIDE 1500 MILLIGRAM(S): 850 TABLET ORAL at 09:06

## 2017-10-29 RX ADMIN — Medication 20 MILLIGRAM(S): at 09:06

## 2017-10-29 RX ADMIN — Medication 20 MILLIGRAM(S): at 13:17

## 2017-10-29 RX ADMIN — QUETIAPINE FUMARATE 50 MILLIGRAM(S): 200 TABLET, FILM COATED ORAL at 09:06

## 2017-10-29 RX ADMIN — QUETIAPINE FUMARATE 50 MILLIGRAM(S): 200 TABLET, FILM COATED ORAL at 13:17

## 2017-10-29 RX ADMIN — Medication 1 MILLIGRAM(S): at 21:33

## 2017-10-29 RX ADMIN — HALOPERIDOL DECANOATE 5 MILLIGRAM(S): 100 INJECTION INTRAMUSCULAR at 21:33

## 2017-10-29 RX ADMIN — Medication 200 MILLIGRAM(S): at 09:06

## 2017-10-29 RX ADMIN — Medication 20 MILLIGRAM(S): at 21:33

## 2017-10-29 RX ADMIN — DIVALPROEX SODIUM 1000 MILLIGRAM(S): 500 TABLET, DELAYED RELEASE ORAL at 21:33

## 2017-10-29 RX ADMIN — Medication 0.5 MILLIGRAM(S): at 13:17

## 2017-10-29 RX ADMIN — Medication 1 EACH: at 21:43

## 2017-10-29 RX ADMIN — Medication 2 MILLIGRAM(S): at 13:17

## 2017-10-29 RX ADMIN — HALOPERIDOL DECANOATE 5 MILLIGRAM(S): 100 INJECTION INTRAMUSCULAR at 09:06

## 2017-10-29 RX ADMIN — Medication 1 MILLIGRAM(S): at 09:06

## 2017-10-29 RX ADMIN — Medication 200 MILLIGRAM(S): at 21:33

## 2017-10-30 PROCEDURE — 99232 SBSQ HOSP IP/OBS MODERATE 35: CPT

## 2017-10-30 RX ADMIN — QUETIAPINE FUMARATE 50 MILLIGRAM(S): 200 TABLET, FILM COATED ORAL at 12:12

## 2017-10-30 RX ADMIN — DIVALPROEX SODIUM 1000 MILLIGRAM(S): 500 TABLET, DELAYED RELEASE ORAL at 21:00

## 2017-10-30 RX ADMIN — METFORMIN HYDROCHLORIDE 1500 MILLIGRAM(S): 850 TABLET ORAL at 08:42

## 2017-10-30 RX ADMIN — HALOPERIDOL DECANOATE 5 MILLIGRAM(S): 100 INJECTION INTRAMUSCULAR at 17:32

## 2017-10-30 RX ADMIN — HALOPERIDOL DECANOATE 5 MILLIGRAM(S): 100 INJECTION INTRAMUSCULAR at 08:42

## 2017-10-30 RX ADMIN — HALOPERIDOL DECANOATE 5 MILLIGRAM(S): 100 INJECTION INTRAMUSCULAR at 21:00

## 2017-10-30 RX ADMIN — Medication 50 MILLIGRAM(S): at 17:32

## 2017-10-30 RX ADMIN — Medication 0.5 MILLIGRAM(S): at 12:12

## 2017-10-30 RX ADMIN — Medication 20 MILLIGRAM(S): at 12:12

## 2017-10-30 RX ADMIN — Medication 200 MILLIGRAM(S): at 21:00

## 2017-10-30 RX ADMIN — QUETIAPINE FUMARATE 50 MILLIGRAM(S): 200 TABLET, FILM COATED ORAL at 08:42

## 2017-10-30 RX ADMIN — Medication 1 EACH: at 14:27

## 2017-10-30 RX ADMIN — Medication 50 MILLIGRAM(S): at 21:35

## 2017-10-30 RX ADMIN — QUETIAPINE FUMARATE 50 MILLIGRAM(S): 200 TABLET, FILM COATED ORAL at 14:24

## 2017-10-30 RX ADMIN — Medication 20 MILLIGRAM(S): at 08:42

## 2017-10-30 RX ADMIN — Medication 1 MILLIGRAM(S): at 21:00

## 2017-10-30 RX ADMIN — Medication 200 MILLIGRAM(S): at 08:42

## 2017-10-30 RX ADMIN — Medication 1 MILLIGRAM(S): at 08:42

## 2017-10-30 RX ADMIN — Medication 20 MILLIGRAM(S): at 21:00

## 2017-10-31 LAB
APPEARANCE UR: CLEAR — SIGNIFICANT CHANGE UP
BILIRUB UR-MCNC: NEGATIVE — SIGNIFICANT CHANGE UP
BLOOD UR QL VISUAL: NEGATIVE — SIGNIFICANT CHANGE UP
COLOR SPEC: YELLOW — SIGNIFICANT CHANGE UP
GLUCOSE UR-MCNC: NEGATIVE — SIGNIFICANT CHANGE UP
KETONES UR-MCNC: SIGNIFICANT CHANGE UP
LEUKOCYTE ESTERASE UR-ACNC: HIGH
MUCOUS THREADS # UR AUTO: SIGNIFICANT CHANGE UP
NITRITE UR-MCNC: NEGATIVE — SIGNIFICANT CHANGE UP
PH UR: 6 — SIGNIFICANT CHANGE UP (ref 4.6–8)
PROT UR-MCNC: 30 — HIGH
RBC CASTS # UR COMP ASSIST: SIGNIFICANT CHANGE UP (ref 0–?)
SP GR SPEC: 1.03 — SIGNIFICANT CHANGE UP (ref 1–1.03)
SQUAMOUS # UR AUTO: SIGNIFICANT CHANGE UP
UROBILINOGEN FLD QL: 1 E.U. — SIGNIFICANT CHANGE UP (ref 0.1–0.2)
WBC CLUMPS #/AREA URNS HPF: PRESENT — HIGH (ref 0–?)
WBC UR QL: >50 — HIGH (ref 0–?)

## 2017-10-31 PROCEDURE — 99232 SBSQ HOSP IP/OBS MODERATE 35: CPT

## 2017-10-31 RX ADMIN — Medication 20 MILLIGRAM(S): at 20:32

## 2017-10-31 RX ADMIN — Medication 1 MILLIGRAM(S): at 09:16

## 2017-10-31 RX ADMIN — QUETIAPINE FUMARATE 50 MILLIGRAM(S): 200 TABLET, FILM COATED ORAL at 09:17

## 2017-10-31 RX ADMIN — Medication 1 MILLIGRAM(S): at 20:32

## 2017-10-31 RX ADMIN — HALOPERIDOL DECANOATE 5 MILLIGRAM(S): 100 INJECTION INTRAMUSCULAR at 09:17

## 2017-10-31 RX ADMIN — Medication 200 MILLIGRAM(S): at 20:32

## 2017-10-31 RX ADMIN — QUETIAPINE FUMARATE 50 MILLIGRAM(S): 200 TABLET, FILM COATED ORAL at 13:35

## 2017-10-31 RX ADMIN — Medication 0.5 MILLIGRAM(S): at 13:35

## 2017-10-31 RX ADMIN — METFORMIN HYDROCHLORIDE 1500 MILLIGRAM(S): 850 TABLET ORAL at 09:17

## 2017-10-31 RX ADMIN — DIVALPROEX SODIUM 1000 MILLIGRAM(S): 500 TABLET, DELAYED RELEASE ORAL at 20:32

## 2017-10-31 RX ADMIN — Medication 20 MILLIGRAM(S): at 09:17

## 2017-10-31 RX ADMIN — Medication 20 MILLIGRAM(S): at 13:35

## 2017-10-31 RX ADMIN — HALOPERIDOL DECANOATE 5 MILLIGRAM(S): 100 INJECTION INTRAMUSCULAR at 20:32

## 2017-10-31 RX ADMIN — Medication 200 MILLIGRAM(S): at 09:16

## 2017-11-01 PROCEDURE — 99232 SBSQ HOSP IP/OBS MODERATE 35: CPT

## 2017-11-01 RX ORDER — NITROFURANTOIN MACROCRYSTAL 50 MG
100 CAPSULE ORAL ONCE
Qty: 0 | Refills: 0 | Status: COMPLETED | OUTPATIENT
Start: 2017-11-01 | End: 2017-11-01

## 2017-11-01 RX ORDER — NITROFURANTOIN MACROCRYSTAL 50 MG
100 CAPSULE ORAL
Qty: 0 | Refills: 0 | Status: DISCONTINUED | OUTPATIENT
Start: 2017-11-01 | End: 2017-11-14

## 2017-11-01 RX ADMIN — Medication 1 EACH: at 05:12

## 2017-11-01 RX ADMIN — Medication 1 MILLIGRAM(S): at 09:14

## 2017-11-01 RX ADMIN — HALOPERIDOL DECANOATE 5 MILLIGRAM(S): 100 INJECTION INTRAMUSCULAR at 20:53

## 2017-11-01 RX ADMIN — HALOPERIDOL DECANOATE 5 MILLIGRAM(S): 100 INJECTION INTRAMUSCULAR at 22:58

## 2017-11-01 RX ADMIN — Medication 0.5 MILLIGRAM(S): at 12:27

## 2017-11-01 RX ADMIN — Medication 200 MILLIGRAM(S): at 20:53

## 2017-11-01 RX ADMIN — Medication 1 MILLIGRAM(S): at 20:53

## 2017-11-01 RX ADMIN — Medication 20 MILLIGRAM(S): at 09:14

## 2017-11-01 RX ADMIN — METFORMIN HYDROCHLORIDE 1500 MILLIGRAM(S): 850 TABLET ORAL at 09:14

## 2017-11-01 RX ADMIN — Medication 20 MILLIGRAM(S): at 12:27

## 2017-11-01 RX ADMIN — Medication 200 MILLIGRAM(S): at 09:15

## 2017-11-01 RX ADMIN — QUETIAPINE FUMARATE 50 MILLIGRAM(S): 200 TABLET, FILM COATED ORAL at 12:27

## 2017-11-01 RX ADMIN — Medication 20 MILLIGRAM(S): at 20:53

## 2017-11-01 RX ADMIN — DIVALPROEX SODIUM 1000 MILLIGRAM(S): 500 TABLET, DELAYED RELEASE ORAL at 20:53

## 2017-11-01 RX ADMIN — Medication 1 MILLIGRAM(S): at 22:57

## 2017-11-01 RX ADMIN — Medication 100 MILLIGRAM(S): at 15:12

## 2017-11-01 RX ADMIN — Medication 100 MILLIGRAM(S): at 17:40

## 2017-11-01 RX ADMIN — Medication 50 MILLIGRAM(S): at 22:57

## 2017-11-01 RX ADMIN — HALOPERIDOL DECANOATE 5 MILLIGRAM(S): 100 INJECTION INTRAMUSCULAR at 09:14

## 2017-11-01 RX ADMIN — QUETIAPINE FUMARATE 50 MILLIGRAM(S): 200 TABLET, FILM COATED ORAL at 09:14

## 2017-11-02 PROCEDURE — 99232 SBSQ HOSP IP/OBS MODERATE 35: CPT

## 2017-11-02 RX ADMIN — Medication 50 MILLIGRAM(S): at 13:19

## 2017-11-02 RX ADMIN — HALOPERIDOL DECANOATE 5 MILLIGRAM(S): 100 INJECTION INTRAMUSCULAR at 13:19

## 2017-11-02 RX ADMIN — QUETIAPINE FUMARATE 50 MILLIGRAM(S): 200 TABLET, FILM COATED ORAL at 20:00

## 2017-11-02 RX ADMIN — Medication 20 MILLIGRAM(S): at 12:46

## 2017-11-02 RX ADMIN — Medication 50 MILLIGRAM(S): at 20:00

## 2017-11-02 RX ADMIN — QUETIAPINE FUMARATE 50 MILLIGRAM(S): 200 TABLET, FILM COATED ORAL at 09:14

## 2017-11-02 RX ADMIN — Medication 100 MILLIGRAM(S): at 17:17

## 2017-11-02 RX ADMIN — Medication 1 MILLIGRAM(S): at 09:13

## 2017-11-02 RX ADMIN — Medication 1 MILLIGRAM(S): at 21:05

## 2017-11-02 RX ADMIN — HALOPERIDOL DECANOATE 5 MILLIGRAM(S): 100 INJECTION INTRAMUSCULAR at 20:00

## 2017-11-02 RX ADMIN — Medication 200 MILLIGRAM(S): at 09:14

## 2017-11-02 RX ADMIN — HALOPERIDOL DECANOATE 5 MILLIGRAM(S): 100 INJECTION INTRAMUSCULAR at 09:13

## 2017-11-02 RX ADMIN — METFORMIN HYDROCHLORIDE 1500 MILLIGRAM(S): 850 TABLET ORAL at 09:14

## 2017-11-02 RX ADMIN — Medication 100 MILLIGRAM(S): at 09:14

## 2017-11-02 RX ADMIN — Medication 20 MILLIGRAM(S): at 09:14

## 2017-11-02 RX ADMIN — HALOPERIDOL DECANOATE 5 MILLIGRAM(S): 100 INJECTION INTRAMUSCULAR at 21:05

## 2017-11-02 RX ADMIN — QUETIAPINE FUMARATE 50 MILLIGRAM(S): 200 TABLET, FILM COATED ORAL at 12:46

## 2017-11-02 RX ADMIN — Medication 20 MILLIGRAM(S): at 21:05

## 2017-11-02 RX ADMIN — DIVALPROEX SODIUM 1000 MILLIGRAM(S): 500 TABLET, DELAYED RELEASE ORAL at 21:05

## 2017-11-02 RX ADMIN — Medication 0.5 MILLIGRAM(S): at 12:46

## 2017-11-02 RX ADMIN — Medication 200 MILLIGRAM(S): at 21:05

## 2017-11-03 PROCEDURE — 99232 SBSQ HOSP IP/OBS MODERATE 35: CPT

## 2017-11-03 RX ORDER — HALOPERIDOL DECANOATE 100 MG/ML
5 INJECTION INTRAMUSCULAR ONCE
Qty: 0 | Refills: 0 | Status: COMPLETED | OUTPATIENT
Start: 2017-11-03 | End: 2017-11-03

## 2017-11-03 RX ORDER — DIPHENHYDRAMINE HCL 50 MG
50 CAPSULE ORAL ONCE
Qty: 0 | Refills: 0 | Status: COMPLETED | OUTPATIENT
Start: 2017-11-03 | End: 2017-11-03

## 2017-11-03 RX ADMIN — Medication 0.5 MILLIGRAM(S): at 12:56

## 2017-11-03 RX ADMIN — Medication 2 MILLIGRAM(S): at 20:40

## 2017-11-03 RX ADMIN — DIVALPROEX SODIUM 1000 MILLIGRAM(S): 500 TABLET, DELAYED RELEASE ORAL at 21:05

## 2017-11-03 RX ADMIN — Medication 100 MILLIGRAM(S): at 08:44

## 2017-11-03 RX ADMIN — Medication 2 MILLIGRAM(S): at 12:56

## 2017-11-03 RX ADMIN — Medication 1 MILLIGRAM(S): at 21:05

## 2017-11-03 RX ADMIN — Medication 20 MILLIGRAM(S): at 21:05

## 2017-11-03 RX ADMIN — Medication 20 MILLIGRAM(S): at 08:44

## 2017-11-03 RX ADMIN — Medication 200 MILLIGRAM(S): at 08:44

## 2017-11-03 RX ADMIN — HALOPERIDOL DECANOATE 5 MILLIGRAM(S): 100 INJECTION INTRAMUSCULAR at 12:56

## 2017-11-03 RX ADMIN — Medication 50 MILLIGRAM(S): at 12:56

## 2017-11-03 RX ADMIN — METFORMIN HYDROCHLORIDE 1500 MILLIGRAM(S): 850 TABLET ORAL at 08:44

## 2017-11-03 RX ADMIN — HALOPERIDOL DECANOATE 5 MILLIGRAM(S): 100 INJECTION INTRAMUSCULAR at 08:44

## 2017-11-03 RX ADMIN — QUETIAPINE FUMARATE 50 MILLIGRAM(S): 200 TABLET, FILM COATED ORAL at 12:56

## 2017-11-03 RX ADMIN — QUETIAPINE FUMARATE 50 MILLIGRAM(S): 200 TABLET, FILM COATED ORAL at 08:44

## 2017-11-03 RX ADMIN — Medication 200 MILLIGRAM(S): at 21:06

## 2017-11-03 RX ADMIN — HALOPERIDOL DECANOATE 5 MILLIGRAM(S): 100 INJECTION INTRAMUSCULAR at 21:05

## 2017-11-03 RX ADMIN — HALOPERIDOL DECANOATE 5 MILLIGRAM(S): 100 INJECTION INTRAMUSCULAR at 20:40

## 2017-11-03 RX ADMIN — Medication 20 MILLIGRAM(S): at 12:56

## 2017-11-03 RX ADMIN — Medication 1 MILLIGRAM(S): at 08:44

## 2017-11-04 PROCEDURE — 99232 SBSQ HOSP IP/OBS MODERATE 35: CPT

## 2017-11-04 RX ORDER — ALBUTEROL 90 UG/1
2.5 AEROSOL, METERED ORAL EVERY 6 HOURS
Qty: 0 | Refills: 0 | Status: DISCONTINUED | OUTPATIENT
Start: 2017-11-04 | End: 2017-11-14

## 2017-11-04 RX ORDER — HALOPERIDOL DECANOATE 100 MG/ML
5 INJECTION INTRAMUSCULAR ONCE
Qty: 0 | Refills: 0 | Status: COMPLETED | OUTPATIENT
Start: 2017-11-04 | End: 2017-11-10

## 2017-11-04 RX ADMIN — Medication 2 MILLIGRAM(S): at 21:45

## 2017-11-04 RX ADMIN — Medication 1 MILLIGRAM(S): at 10:48

## 2017-11-04 RX ADMIN — HALOPERIDOL DECANOATE 5 MILLIGRAM(S): 100 INJECTION INTRAMUSCULAR at 21:45

## 2017-11-04 RX ADMIN — Medication 50 MILLIGRAM(S): at 20:47

## 2017-11-04 RX ADMIN — Medication 20 MILLIGRAM(S): at 10:48

## 2017-11-04 RX ADMIN — Medication 200 MILLIGRAM(S): at 10:48

## 2017-11-04 RX ADMIN — Medication 20 MILLIGRAM(S): at 13:02

## 2017-11-04 RX ADMIN — HALOPERIDOL DECANOATE 5 MILLIGRAM(S): 100 INJECTION INTRAMUSCULAR at 20:46

## 2017-11-04 RX ADMIN — Medication 200 MILLIGRAM(S): at 20:46

## 2017-11-04 RX ADMIN — HALOPERIDOL DECANOATE 5 MILLIGRAM(S): 100 INJECTION INTRAMUSCULAR at 10:48

## 2017-11-04 RX ADMIN — METFORMIN HYDROCHLORIDE 1500 MILLIGRAM(S): 850 TABLET ORAL at 10:48

## 2017-11-04 RX ADMIN — QUETIAPINE FUMARATE 50 MILLIGRAM(S): 200 TABLET, FILM COATED ORAL at 21:45

## 2017-11-04 RX ADMIN — Medication 100 MILLIGRAM(S): at 16:07

## 2017-11-04 RX ADMIN — Medication 1 MILLIGRAM(S): at 20:46

## 2017-11-04 RX ADMIN — Medication 0.5 MILLIGRAM(S): at 13:02

## 2017-11-04 RX ADMIN — Medication 2 MILLIGRAM(S): at 13:02

## 2017-11-04 RX ADMIN — DIVALPROEX SODIUM 1000 MILLIGRAM(S): 500 TABLET, DELAYED RELEASE ORAL at 20:46

## 2017-11-04 RX ADMIN — Medication 20 MILLIGRAM(S): at 20:46

## 2017-11-04 RX ADMIN — QUETIAPINE FUMARATE 50 MILLIGRAM(S): 200 TABLET, FILM COATED ORAL at 10:48

## 2017-11-04 RX ADMIN — QUETIAPINE FUMARATE 50 MILLIGRAM(S): 200 TABLET, FILM COATED ORAL at 13:02

## 2017-11-04 RX ADMIN — Medication 100 MILLIGRAM(S): at 10:48

## 2017-11-05 PROCEDURE — 99232 SBSQ HOSP IP/OBS MODERATE 35: CPT

## 2017-11-05 RX ORDER — HALOPERIDOL DECANOATE 100 MG/ML
5 INJECTION INTRAMUSCULAR ONCE
Qty: 0 | Refills: 0 | Status: DISCONTINUED | OUTPATIENT
Start: 2017-11-05 | End: 2017-11-14

## 2017-11-05 RX ADMIN — METFORMIN HYDROCHLORIDE 1500 MILLIGRAM(S): 850 TABLET ORAL at 09:55

## 2017-11-05 RX ADMIN — HALOPERIDOL DECANOATE 5 MILLIGRAM(S): 100 INJECTION INTRAMUSCULAR at 21:05

## 2017-11-05 RX ADMIN — Medication 20 MILLIGRAM(S): at 21:05

## 2017-11-05 RX ADMIN — DIVALPROEX SODIUM 1000 MILLIGRAM(S): 500 TABLET, DELAYED RELEASE ORAL at 21:05

## 2017-11-05 RX ADMIN — QUETIAPINE FUMARATE 50 MILLIGRAM(S): 200 TABLET, FILM COATED ORAL at 20:31

## 2017-11-05 RX ADMIN — Medication 2 MILLIGRAM(S): at 15:16

## 2017-11-05 RX ADMIN — QUETIAPINE FUMARATE 50 MILLIGRAM(S): 200 TABLET, FILM COATED ORAL at 14:16

## 2017-11-05 RX ADMIN — Medication 0.5 MILLIGRAM(S): at 14:16

## 2017-11-05 RX ADMIN — Medication 1 MILLIGRAM(S): at 21:05

## 2017-11-05 RX ADMIN — Medication 200 MILLIGRAM(S): at 21:05

## 2017-11-05 RX ADMIN — Medication 100 MILLIGRAM(S): at 09:54

## 2017-11-05 RX ADMIN — HALOPERIDOL DECANOATE 5 MILLIGRAM(S): 100 INJECTION INTRAMUSCULAR at 09:54

## 2017-11-05 RX ADMIN — Medication 200 MILLIGRAM(S): at 09:55

## 2017-11-05 RX ADMIN — Medication 20 MILLIGRAM(S): at 09:55

## 2017-11-05 RX ADMIN — HALOPERIDOL DECANOATE 5 MILLIGRAM(S): 100 INJECTION INTRAMUSCULAR at 15:16

## 2017-11-05 RX ADMIN — Medication 100 MILLIGRAM(S): at 16:58

## 2017-11-05 RX ADMIN — QUETIAPINE FUMARATE 50 MILLIGRAM(S): 200 TABLET, FILM COATED ORAL at 09:55

## 2017-11-05 RX ADMIN — ALBUTEROL 2.5 MILLIGRAM(S): 90 AEROSOL, METERED ORAL at 14:00

## 2017-11-05 RX ADMIN — Medication 1 MILLIGRAM(S): at 09:54

## 2017-11-05 RX ADMIN — Medication 20 MILLIGRAM(S): at 14:16

## 2017-11-05 RX ADMIN — Medication 50 MILLIGRAM(S): at 20:32

## 2017-11-05 RX ADMIN — Medication 50 MILLIGRAM(S): at 15:15

## 2017-11-06 PROCEDURE — 99232 SBSQ HOSP IP/OBS MODERATE 35: CPT

## 2017-11-06 RX ADMIN — Medication 1 MILLIGRAM(S): at 09:05

## 2017-11-06 RX ADMIN — Medication 20 MILLIGRAM(S): at 12:04

## 2017-11-06 RX ADMIN — METFORMIN HYDROCHLORIDE 1500 MILLIGRAM(S): 850 TABLET ORAL at 09:05

## 2017-11-06 RX ADMIN — HALOPERIDOL DECANOATE 5 MILLIGRAM(S): 100 INJECTION INTRAMUSCULAR at 18:28

## 2017-11-06 RX ADMIN — HALOPERIDOL DECANOATE 5 MILLIGRAM(S): 100 INJECTION INTRAMUSCULAR at 11:25

## 2017-11-06 RX ADMIN — Medication 50 MILLIGRAM(S): at 18:29

## 2017-11-06 RX ADMIN — HALOPERIDOL DECANOATE 5 MILLIGRAM(S): 100 INJECTION INTRAMUSCULAR at 00:00

## 2017-11-06 RX ADMIN — Medication 200 MILLIGRAM(S): at 21:35

## 2017-11-06 RX ADMIN — DIVALPROEX SODIUM 1000 MILLIGRAM(S): 500 TABLET, DELAYED RELEASE ORAL at 21:35

## 2017-11-06 RX ADMIN — Medication 2 MILLIGRAM(S): at 18:28

## 2017-11-06 RX ADMIN — Medication 0.5 MILLIGRAM(S): at 11:25

## 2017-11-06 RX ADMIN — Medication 50 MILLIGRAM(S): at 11:25

## 2017-11-06 RX ADMIN — HALOPERIDOL DECANOATE 5 MILLIGRAM(S): 100 INJECTION INTRAMUSCULAR at 21:35

## 2017-11-06 RX ADMIN — Medication 100 MILLIGRAM(S): at 09:05

## 2017-11-06 RX ADMIN — Medication 2 MILLIGRAM(S): at 09:07

## 2017-11-06 RX ADMIN — Medication 100 MILLIGRAM(S): at 15:27

## 2017-11-06 RX ADMIN — QUETIAPINE FUMARATE 50 MILLIGRAM(S): 200 TABLET, FILM COATED ORAL at 09:06

## 2017-11-06 RX ADMIN — HALOPERIDOL DECANOATE 5 MILLIGRAM(S): 100 INJECTION INTRAMUSCULAR at 09:05

## 2017-11-06 RX ADMIN — Medication 20 MILLIGRAM(S): at 09:06

## 2017-11-06 RX ADMIN — Medication 20 MILLIGRAM(S): at 21:35

## 2017-11-06 RX ADMIN — Medication 1 MILLIGRAM(S): at 21:35

## 2017-11-06 RX ADMIN — Medication 2 MILLIGRAM(S): at 00:00

## 2017-11-06 RX ADMIN — QUETIAPINE FUMARATE 50 MILLIGRAM(S): 200 TABLET, FILM COATED ORAL at 12:04

## 2017-11-06 RX ADMIN — Medication 200 MILLIGRAM(S): at 09:06

## 2017-11-07 LAB
CHOLEST SERPL-MCNC: 209 MG/DL — HIGH (ref 120–199)
HBA1C BLD-MCNC: 6 % — HIGH (ref 4–5.6)
HDLC SERPL-MCNC: 42 MG/DL — LOW (ref 45–65)
LIPID PNL WITH DIRECT LDL SERPL: 140 MG/DL — SIGNIFICANT CHANGE UP
TRIGL SERPL-MCNC: 211 MG/DL — HIGH (ref 10–149)

## 2017-11-07 PROCEDURE — 99232 SBSQ HOSP IP/OBS MODERATE 35: CPT

## 2017-11-07 PROCEDURE — 90832 PSYTX W PT 30 MINUTES: CPT

## 2017-11-07 RX ORDER — HALOPERIDOL DECANOATE 100 MG/ML
100 INJECTION INTRAMUSCULAR
Qty: 0 | Refills: 0 | Status: DISCONTINUED | OUTPATIENT
Start: 2017-11-07 | End: 2017-11-14

## 2017-11-07 RX ADMIN — Medication 1 MILLIGRAM(S): at 21:13

## 2017-11-07 RX ADMIN — Medication 20 MILLIGRAM(S): at 09:09

## 2017-11-07 RX ADMIN — Medication 1 MILLIGRAM(S): at 09:09

## 2017-11-07 RX ADMIN — Medication 20 MILLIGRAM(S): at 13:00

## 2017-11-07 RX ADMIN — Medication 20 MILLIGRAM(S): at 21:13

## 2017-11-07 RX ADMIN — HALOPERIDOL DECANOATE 5 MILLIGRAM(S): 100 INJECTION INTRAMUSCULAR at 09:10

## 2017-11-07 RX ADMIN — Medication 0.5 MILLIGRAM(S): at 12:33

## 2017-11-07 RX ADMIN — Medication 200 MILLIGRAM(S): at 09:09

## 2017-11-07 RX ADMIN — DIVALPROEX SODIUM 1000 MILLIGRAM(S): 500 TABLET, DELAYED RELEASE ORAL at 21:13

## 2017-11-07 RX ADMIN — HALOPERIDOL DECANOATE 100 MILLIGRAM(S): 100 INJECTION INTRAMUSCULAR at 15:01

## 2017-11-07 RX ADMIN — Medication 200 MILLIGRAM(S): at 21:13

## 2017-11-07 RX ADMIN — ALBUTEROL 2.5 MILLIGRAM(S): 90 AEROSOL, METERED ORAL at 18:30

## 2017-11-07 RX ADMIN — Medication 100 MILLIGRAM(S): at 09:09

## 2017-11-07 RX ADMIN — METFORMIN HYDROCHLORIDE 1500 MILLIGRAM(S): 850 TABLET ORAL at 09:09

## 2017-11-07 RX ADMIN — QUETIAPINE FUMARATE 50 MILLIGRAM(S): 200 TABLET, FILM COATED ORAL at 13:00

## 2017-11-07 RX ADMIN — HALOPERIDOL DECANOATE 5 MILLIGRAM(S): 100 INJECTION INTRAMUSCULAR at 21:13

## 2017-11-07 RX ADMIN — Medication 50 MILLIGRAM(S): at 20:30

## 2017-11-07 RX ADMIN — QUETIAPINE FUMARATE 50 MILLIGRAM(S): 200 TABLET, FILM COATED ORAL at 09:10

## 2017-11-07 RX ADMIN — Medication 100 MILLIGRAM(S): at 17:07

## 2017-11-08 PROCEDURE — 99232 SBSQ HOSP IP/OBS MODERATE 35: CPT

## 2017-11-08 RX ADMIN — HALOPERIDOL DECANOATE 5 MILLIGRAM(S): 100 INJECTION INTRAMUSCULAR at 11:11

## 2017-11-08 RX ADMIN — HALOPERIDOL DECANOATE 5 MILLIGRAM(S): 100 INJECTION INTRAMUSCULAR at 00:30

## 2017-11-08 RX ADMIN — Medication 200 MILLIGRAM(S): at 20:30

## 2017-11-08 RX ADMIN — HALOPERIDOL DECANOATE 5 MILLIGRAM(S): 100 INJECTION INTRAMUSCULAR at 15:50

## 2017-11-08 RX ADMIN — HALOPERIDOL DECANOATE 5 MILLIGRAM(S): 100 INJECTION INTRAMUSCULAR at 11:36

## 2017-11-08 RX ADMIN — METFORMIN HYDROCHLORIDE 1500 MILLIGRAM(S): 850 TABLET ORAL at 11:12

## 2017-11-08 RX ADMIN — Medication 50 MILLIGRAM(S): at 20:30

## 2017-11-08 RX ADMIN — QUETIAPINE FUMARATE 50 MILLIGRAM(S): 200 TABLET, FILM COATED ORAL at 00:30

## 2017-11-08 RX ADMIN — Medication 100 MILLIGRAM(S): at 11:12

## 2017-11-08 RX ADMIN — QUETIAPINE FUMARATE 50 MILLIGRAM(S): 200 TABLET, FILM COATED ORAL at 11:36

## 2017-11-08 RX ADMIN — Medication 1 MILLIGRAM(S): at 20:30

## 2017-11-08 RX ADMIN — Medication 1 MILLIGRAM(S): at 11:12

## 2017-11-08 RX ADMIN — Medication 2 MILLIGRAM(S): at 00:30

## 2017-11-08 RX ADMIN — Medication 0.5 MILLIGRAM(S): at 15:50

## 2017-11-08 RX ADMIN — Medication 20 MILLIGRAM(S): at 15:50

## 2017-11-08 RX ADMIN — Medication 200 MILLIGRAM(S): at 11:12

## 2017-11-08 RX ADMIN — Medication 2 MILLIGRAM(S): at 15:49

## 2017-11-08 RX ADMIN — ALBUTEROL 2.5 MILLIGRAM(S): 90 AEROSOL, METERED ORAL at 00:37

## 2017-11-08 RX ADMIN — Medication 20 MILLIGRAM(S): at 11:12

## 2017-11-08 RX ADMIN — Medication 50 MILLIGRAM(S): at 11:35

## 2017-11-08 RX ADMIN — HALOPERIDOL DECANOATE 5 MILLIGRAM(S): 100 INJECTION INTRAMUSCULAR at 20:30

## 2017-11-08 RX ADMIN — Medication 20 MILLIGRAM(S): at 20:30

## 2017-11-08 RX ADMIN — QUETIAPINE FUMARATE 50 MILLIGRAM(S): 200 TABLET, FILM COATED ORAL at 15:50

## 2017-11-08 RX ADMIN — DIVALPROEX SODIUM 1000 MILLIGRAM(S): 500 TABLET, DELAYED RELEASE ORAL at 20:30

## 2017-11-08 RX ADMIN — ALBUTEROL 2.5 MILLIGRAM(S): 90 AEROSOL, METERED ORAL at 21:59

## 2017-11-08 RX ADMIN — QUETIAPINE FUMARATE 50 MILLIGRAM(S): 200 TABLET, FILM COATED ORAL at 11:12

## 2017-11-08 RX ADMIN — Medication 50 MILLIGRAM(S): at 15:50

## 2017-11-09 PROCEDURE — 99232 SBSQ HOSP IP/OBS MODERATE 35: CPT

## 2017-11-09 RX ADMIN — Medication 0.5 MILLIGRAM(S): at 13:17

## 2017-11-09 RX ADMIN — Medication 20 MILLIGRAM(S): at 21:00

## 2017-11-09 RX ADMIN — QUETIAPINE FUMARATE 50 MILLIGRAM(S): 200 TABLET, FILM COATED ORAL at 22:00

## 2017-11-09 RX ADMIN — Medication 200 MILLIGRAM(S): at 21:00

## 2017-11-09 RX ADMIN — Medication 20 MILLIGRAM(S): at 13:17

## 2017-11-09 RX ADMIN — HALOPERIDOL DECANOATE 5 MILLIGRAM(S): 100 INJECTION INTRAMUSCULAR at 21:00

## 2017-11-09 RX ADMIN — QUETIAPINE FUMARATE 50 MILLIGRAM(S): 200 TABLET, FILM COATED ORAL at 13:17

## 2017-11-09 RX ADMIN — Medication 1 MILLIGRAM(S): at 21:00

## 2017-11-09 RX ADMIN — ALBUTEROL 2.5 MILLIGRAM(S): 90 AEROSOL, METERED ORAL at 22:33

## 2017-11-09 RX ADMIN — Medication 20 MILLIGRAM(S): at 09:36

## 2017-11-09 RX ADMIN — Medication 50 MILLIGRAM(S): at 20:50

## 2017-11-09 RX ADMIN — DIVALPROEX SODIUM 1000 MILLIGRAM(S): 500 TABLET, DELAYED RELEASE ORAL at 21:00

## 2017-11-09 RX ADMIN — QUETIAPINE FUMARATE 50 MILLIGRAM(S): 200 TABLET, FILM COATED ORAL at 09:36

## 2017-11-09 RX ADMIN — Medication 200 MILLIGRAM(S): at 09:36

## 2017-11-09 RX ADMIN — HALOPERIDOL DECANOATE 5 MILLIGRAM(S): 100 INJECTION INTRAMUSCULAR at 09:36

## 2017-11-09 RX ADMIN — Medication 1 MILLIGRAM(S): at 09:35

## 2017-11-09 RX ADMIN — METFORMIN HYDROCHLORIDE 1500 MILLIGRAM(S): 850 TABLET ORAL at 09:36

## 2017-11-10 PROCEDURE — 99232 SBSQ HOSP IP/OBS MODERATE 35: CPT

## 2017-11-10 RX ORDER — HALOPERIDOL DECANOATE 100 MG/ML
5 INJECTION INTRAMUSCULAR ONCE
Qty: 0 | Refills: 0 | Status: COMPLETED | OUTPATIENT
Start: 2017-11-10 | End: 2017-11-10

## 2017-11-10 RX ORDER — DIPHENHYDRAMINE HCL 50 MG
50 CAPSULE ORAL ONCE
Qty: 0 | Refills: 0 | Status: DISCONTINUED | OUTPATIENT
Start: 2017-11-10 | End: 2017-11-14

## 2017-11-10 RX ORDER — HALOPERIDOL DECANOATE 100 MG/ML
7.5 INJECTION INTRAMUSCULAR ONCE
Qty: 0 | Refills: 0 | Status: COMPLETED | OUTPATIENT
Start: 2017-11-10 | End: 2017-11-10

## 2017-11-10 RX ORDER — DIPHENHYDRAMINE HCL 50 MG
50 CAPSULE ORAL ONCE
Qty: 0 | Refills: 0 | Status: COMPLETED | OUTPATIENT
Start: 2017-11-10 | End: 2017-11-10

## 2017-11-10 RX ADMIN — Medication 1 MILLIGRAM(S): at 21:21

## 2017-11-10 RX ADMIN — DIVALPROEX SODIUM 1000 MILLIGRAM(S): 500 TABLET, DELAYED RELEASE ORAL at 21:21

## 2017-11-10 RX ADMIN — HALOPERIDOL DECANOATE 5 MILLIGRAM(S): 100 INJECTION INTRAMUSCULAR at 09:55

## 2017-11-10 RX ADMIN — Medication 50 MILLIGRAM(S): at 10:16

## 2017-11-10 RX ADMIN — Medication 50 MILLIGRAM(S): at 09:55

## 2017-11-10 RX ADMIN — Medication 200 MILLIGRAM(S): at 21:21

## 2017-11-10 RX ADMIN — Medication 50 MILLIGRAM(S): at 23:35

## 2017-11-10 RX ADMIN — HALOPERIDOL DECANOATE 5 MILLIGRAM(S): 100 INJECTION INTRAMUSCULAR at 21:21

## 2017-11-10 RX ADMIN — Medication 0.5 MILLIGRAM(S): at 13:52

## 2017-11-10 RX ADMIN — HALOPERIDOL DECANOATE 5 MILLIGRAM(S): 100 INJECTION INTRAMUSCULAR at 23:35

## 2017-11-10 RX ADMIN — HALOPERIDOL DECANOATE 7.5 MILLIGRAM(S): 100 INJECTION INTRAMUSCULAR at 10:16

## 2017-11-10 RX ADMIN — Medication 3 MILLIGRAM(S): at 10:16

## 2017-11-10 RX ADMIN — Medication 2 MILLIGRAM(S): at 23:35

## 2017-11-10 RX ADMIN — ALBUTEROL 2.5 MILLIGRAM(S): 90 AEROSOL, METERED ORAL at 17:00

## 2017-11-10 RX ADMIN — Medication 20 MILLIGRAM(S): at 21:21

## 2017-11-10 RX ADMIN — QUETIAPINE FUMARATE 50 MILLIGRAM(S): 200 TABLET, FILM COATED ORAL at 13:53

## 2017-11-10 RX ADMIN — Medication 20 MILLIGRAM(S): at 13:53

## 2017-11-10 RX ADMIN — Medication 200 MILLIGRAM(S): at 09:55

## 2017-11-10 RX ADMIN — QUETIAPINE FUMARATE 50 MILLIGRAM(S): 200 TABLET, FILM COATED ORAL at 09:55

## 2017-11-10 RX ADMIN — Medication 1 MILLIGRAM(S): at 09:55

## 2017-11-10 RX ADMIN — METFORMIN HYDROCHLORIDE 1500 MILLIGRAM(S): 850 TABLET ORAL at 09:55

## 2017-11-10 RX ADMIN — Medication 20 MILLIGRAM(S): at 09:55

## 2017-11-10 RX ADMIN — Medication 2 MILLIGRAM(S): at 09:55

## 2017-11-11 LAB
APPEARANCE UR: CLEAR — SIGNIFICANT CHANGE UP
BILIRUB UR-MCNC: NEGATIVE — SIGNIFICANT CHANGE UP
BLOOD UR QL VISUAL: NEGATIVE — SIGNIFICANT CHANGE UP
COLOR SPEC: SIGNIFICANT CHANGE UP
GLUCOSE UR-MCNC: NEGATIVE — SIGNIFICANT CHANGE UP
KETONES UR-MCNC: NEGATIVE — SIGNIFICANT CHANGE UP
LEUKOCYTE ESTERASE UR-ACNC: HIGH
NITRITE UR-MCNC: NEGATIVE — SIGNIFICANT CHANGE UP
PH UR: 7 — SIGNIFICANT CHANGE UP (ref 4.6–8)
PROT UR-MCNC: NEGATIVE — SIGNIFICANT CHANGE UP
RBC CASTS # UR COMP ASSIST: SIGNIFICANT CHANGE UP (ref 0–?)
SP GR SPEC: 1.02 — SIGNIFICANT CHANGE UP (ref 1–1.03)
SQUAMOUS # UR AUTO: SIGNIFICANT CHANGE UP
UROBILINOGEN FLD QL: NORMAL E.U. — SIGNIFICANT CHANGE UP (ref 0.1–0.2)
WBC UR QL: >50 — HIGH (ref 0–?)

## 2017-11-11 RX ORDER — NITROFURANTOIN MACROCRYSTAL 50 MG
100 CAPSULE ORAL
Qty: 0 | Refills: 0 | Status: DISCONTINUED | OUTPATIENT
Start: 2017-11-11 | End: 2017-11-14

## 2017-11-11 RX ORDER — PHENAZOPYRIDINE HCL 100 MG
100 TABLET ORAL
Qty: 0 | Refills: 0 | Status: COMPLETED | OUTPATIENT
Start: 2017-11-11 | End: 2017-11-13

## 2017-11-11 RX ADMIN — QUETIAPINE FUMARATE 50 MILLIGRAM(S): 200 TABLET, FILM COATED ORAL at 12:30

## 2017-11-11 RX ADMIN — Medication 1 MILLIGRAM(S): at 08:15

## 2017-11-11 RX ADMIN — Medication 200 MILLIGRAM(S): at 21:34

## 2017-11-11 RX ADMIN — QUETIAPINE FUMARATE 50 MILLIGRAM(S): 200 TABLET, FILM COATED ORAL at 08:15

## 2017-11-11 RX ADMIN — Medication 0.5 MILLIGRAM(S): at 12:30

## 2017-11-11 RX ADMIN — METFORMIN HYDROCHLORIDE 1500 MILLIGRAM(S): 850 TABLET ORAL at 08:15

## 2017-11-11 RX ADMIN — Medication 200 MILLIGRAM(S): at 08:15

## 2017-11-11 RX ADMIN — HALOPERIDOL DECANOATE 5 MILLIGRAM(S): 100 INJECTION INTRAMUSCULAR at 16:42

## 2017-11-11 RX ADMIN — HALOPERIDOL DECANOATE 5 MILLIGRAM(S): 100 INJECTION INTRAMUSCULAR at 08:15

## 2017-11-11 RX ADMIN — Medication 20 MILLIGRAM(S): at 12:30

## 2017-11-11 RX ADMIN — Medication 20 MILLIGRAM(S): at 08:15

## 2017-11-11 RX ADMIN — Medication 1 MILLIGRAM(S): at 21:33

## 2017-11-11 RX ADMIN — DIVALPROEX SODIUM 1000 MILLIGRAM(S): 500 TABLET, DELAYED RELEASE ORAL at 21:33

## 2017-11-11 RX ADMIN — HALOPERIDOL DECANOATE 5 MILLIGRAM(S): 100 INJECTION INTRAMUSCULAR at 21:33

## 2017-11-11 RX ADMIN — Medication 2 MILLIGRAM(S): at 16:42

## 2017-11-11 RX ADMIN — Medication 20 MILLIGRAM(S): at 21:34

## 2017-11-12 PROCEDURE — 99232 SBSQ HOSP IP/OBS MODERATE 35: CPT

## 2017-11-12 RX ORDER — ACETAMINOPHEN 500 MG
650 TABLET ORAL EVERY 6 HOURS
Qty: 0 | Refills: 0 | Status: DISCONTINUED | OUTPATIENT
Start: 2017-11-12 | End: 2017-11-14

## 2017-11-12 RX ADMIN — Medication 200 MILLIGRAM(S): at 21:46

## 2017-11-12 RX ADMIN — HALOPERIDOL DECANOATE 5 MILLIGRAM(S): 100 INJECTION INTRAMUSCULAR at 11:00

## 2017-11-12 RX ADMIN — Medication 650 MILLIGRAM(S): at 22:10

## 2017-11-12 RX ADMIN — Medication 20 MILLIGRAM(S): at 21:46

## 2017-11-12 RX ADMIN — Medication 100 MILLIGRAM(S): at 17:01

## 2017-11-12 RX ADMIN — Medication 20 MILLIGRAM(S): at 11:01

## 2017-11-12 RX ADMIN — Medication 100 MILLIGRAM(S): at 11:02

## 2017-11-12 RX ADMIN — Medication 20 MILLIGRAM(S): at 14:41

## 2017-11-12 RX ADMIN — Medication 50 MILLIGRAM(S): at 21:00

## 2017-11-12 RX ADMIN — DIVALPROEX SODIUM 1000 MILLIGRAM(S): 500 TABLET, DELAYED RELEASE ORAL at 21:46

## 2017-11-12 RX ADMIN — Medication 1 MILLIGRAM(S): at 21:46

## 2017-11-12 RX ADMIN — Medication 100 MILLIGRAM(S): at 14:42

## 2017-11-12 RX ADMIN — Medication 200 MILLIGRAM(S): at 11:01

## 2017-11-12 RX ADMIN — HALOPERIDOL DECANOATE 5 MILLIGRAM(S): 100 INJECTION INTRAMUSCULAR at 21:00

## 2017-11-12 RX ADMIN — QUETIAPINE FUMARATE 50 MILLIGRAM(S): 200 TABLET, FILM COATED ORAL at 14:42

## 2017-11-12 RX ADMIN — Medication 0.5 MILLIGRAM(S): at 14:42

## 2017-11-12 RX ADMIN — METFORMIN HYDROCHLORIDE 1500 MILLIGRAM(S): 850 TABLET ORAL at 11:00

## 2017-11-12 RX ADMIN — QUETIAPINE FUMARATE 50 MILLIGRAM(S): 200 TABLET, FILM COATED ORAL at 11:01

## 2017-11-12 RX ADMIN — QUETIAPINE FUMARATE 50 MILLIGRAM(S): 200 TABLET, FILM COATED ORAL at 21:00

## 2017-11-12 RX ADMIN — Medication 100 MILLIGRAM(S): at 17:00

## 2017-11-12 RX ADMIN — Medication 1 MILLIGRAM(S): at 11:01

## 2017-11-12 RX ADMIN — Medication 2 MILLIGRAM(S): at 21:00

## 2017-11-12 RX ADMIN — HALOPERIDOL DECANOATE 5 MILLIGRAM(S): 100 INJECTION INTRAMUSCULAR at 21:46

## 2017-11-13 PROCEDURE — 99232 SBSQ HOSP IP/OBS MODERATE 35: CPT

## 2017-11-13 RX ORDER — METFORMIN HYDROCHLORIDE 850 MG/1
3 TABLET ORAL
Qty: 84 | Refills: 0 | OUTPATIENT
Start: 2017-11-13 | End: 2017-12-11

## 2017-11-13 RX ORDER — PROPRANOLOL HCL 160 MG
1 CAPSULE, EXTENDED RELEASE 24HR ORAL
Qty: 84 | Refills: 0 | OUTPATIENT
Start: 2017-11-13 | End: 2017-12-11

## 2017-11-13 RX ORDER — DIVALPROEX SODIUM 500 MG/1
2 TABLET, DELAYED RELEASE ORAL
Qty: 56 | Refills: 0 | OUTPATIENT
Start: 2017-11-13 | End: 2017-12-11

## 2017-11-13 RX ORDER — TOPIRAMATE 25 MG
1 TABLET ORAL
Qty: 56 | Refills: 0 | OUTPATIENT
Start: 2017-11-13 | End: 2017-12-11

## 2017-11-13 RX ADMIN — Medication 200 MILLIGRAM(S): at 10:31

## 2017-11-13 RX ADMIN — Medication 100 MILLIGRAM(S): at 13:46

## 2017-11-13 RX ADMIN — HALOPERIDOL DECANOATE 5 MILLIGRAM(S): 100 INJECTION INTRAMUSCULAR at 21:14

## 2017-11-13 RX ADMIN — Medication 100 MILLIGRAM(S): at 10:31

## 2017-11-13 RX ADMIN — Medication 0.5 MILLIGRAM(S): at 13:46

## 2017-11-13 RX ADMIN — Medication 2 MILLIGRAM(S): at 12:46

## 2017-11-13 RX ADMIN — METFORMIN HYDROCHLORIDE 1500 MILLIGRAM(S): 850 TABLET ORAL at 10:31

## 2017-11-13 RX ADMIN — Medication 20 MILLIGRAM(S): at 10:31

## 2017-11-13 RX ADMIN — QUETIAPINE FUMARATE 50 MILLIGRAM(S): 200 TABLET, FILM COATED ORAL at 13:46

## 2017-11-13 RX ADMIN — DIVALPROEX SODIUM 1000 MILLIGRAM(S): 500 TABLET, DELAYED RELEASE ORAL at 21:14

## 2017-11-13 RX ADMIN — Medication 20 MILLIGRAM(S): at 21:14

## 2017-11-13 RX ADMIN — Medication 1 MILLIGRAM(S): at 10:31

## 2017-11-13 RX ADMIN — Medication 100 MILLIGRAM(S): at 15:47

## 2017-11-13 RX ADMIN — Medication 100 MILLIGRAM(S): at 16:57

## 2017-11-13 RX ADMIN — Medication 1 MILLIGRAM(S): at 21:14

## 2017-11-13 RX ADMIN — Medication 200 MILLIGRAM(S): at 21:14

## 2017-11-13 RX ADMIN — QUETIAPINE FUMARATE 50 MILLIGRAM(S): 200 TABLET, FILM COATED ORAL at 10:31

## 2017-11-13 RX ADMIN — HALOPERIDOL DECANOATE 5 MILLIGRAM(S): 100 INJECTION INTRAMUSCULAR at 10:31

## 2017-11-13 RX ADMIN — Medication 20 MILLIGRAM(S): at 13:46

## 2017-11-14 VITALS — TEMPERATURE: 98 F

## 2017-11-14 PROCEDURE — 99238 HOSP IP/OBS DSCHRG MGMT 30/<: CPT

## 2017-11-14 RX ORDER — ALBUTEROL 90 UG/1
2 AEROSOL, METERED ORAL
Qty: 0 | Refills: 0 | COMMUNITY

## 2017-11-14 RX ORDER — METFORMIN HYDROCHLORIDE 850 MG/1
0 TABLET ORAL
Qty: 0 | Refills: 0 | COMMUNITY

## 2017-11-14 RX ORDER — NORETHINDRONE 0.35 MG/1
1 TABLET ORAL
Qty: 0 | Refills: 0 | COMMUNITY

## 2017-11-14 RX ORDER — HALOPERIDOL DECANOATE 100 MG/ML
200 INJECTION INTRAMUSCULAR
Qty: 0 | Refills: 0 | COMMUNITY

## 2017-11-14 RX ORDER — PROPRANOLOL HCL 160 MG
0 CAPSULE, EXTENDED RELEASE 24HR ORAL
Qty: 0 | Refills: 0 | COMMUNITY

## 2017-11-14 RX ORDER — TOPIRAMATE 25 MG
1 TABLET ORAL
Qty: 0 | Refills: 0 | COMMUNITY

## 2017-11-14 RX ORDER — DIVALPROEX SODIUM 500 MG/1
500 TABLET, DELAYED RELEASE ORAL
Qty: 0 | Refills: 0 | COMMUNITY

## 2017-11-14 RX ADMIN — METFORMIN HYDROCHLORIDE 1500 MILLIGRAM(S): 850 TABLET ORAL at 08:13

## 2017-11-14 RX ADMIN — Medication 20 MILLIGRAM(S): at 08:14

## 2017-11-14 RX ADMIN — HALOPERIDOL DECANOATE 5 MILLIGRAM(S): 100 INJECTION INTRAMUSCULAR at 08:13

## 2017-11-14 RX ADMIN — Medication 200 MILLIGRAM(S): at 08:14

## 2017-11-14 RX ADMIN — QUETIAPINE FUMARATE 50 MILLIGRAM(S): 200 TABLET, FILM COATED ORAL at 08:14

## 2017-11-14 RX ADMIN — Medication 1 MILLIGRAM(S): at 08:13

## 2017-11-14 RX ADMIN — Medication 100 MILLIGRAM(S): at 08:13

## 2017-11-15 ENCOUNTER — EMERGENCY (EMERGENCY)
Facility: HOSPITAL | Age: 22
LOS: 1 days | Discharge: HOME | End: 2017-11-15
Attending: EMERGENCY MEDICINE
Payer: MEDICAID

## 2017-11-15 VITALS
RESPIRATION RATE: 17 BRPM | OXYGEN SATURATION: 98 % | TEMPERATURE: 98 F | WEIGHT: 184.97 LBS | HEIGHT: 68 IN | HEART RATE: 84 BPM | DIASTOLIC BLOOD PRESSURE: 84 MMHG | SYSTOLIC BLOOD PRESSURE: 122 MMHG

## 2017-11-15 PROCEDURE — 99283 EMERGENCY DEPT VISIT LOW MDM: CPT

## 2017-11-16 VITALS
OXYGEN SATURATION: 99 % | DIASTOLIC BLOOD PRESSURE: 74 MMHG | TEMPERATURE: 98 F | SYSTOLIC BLOOD PRESSURE: 126 MMHG | RESPIRATION RATE: 18 BRPM | HEART RATE: 69 BPM
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
## 2017-11-16 NOTE — ED PROVIDER NOTE - CHPI ED SYMPTOMS NEG
no weakness/no back pain/no deformity/no fever/no stiffness/no difficulty bearing weight/no bruising/no tingling/no numbness/no abrasion

## 2017-11-16 NOTE — ED PROVIDER NOTE - PROGRESS NOTE DETAILS
PA NOTE: PT seen resting comfortably in no acute distress, no acute complaints at this time, PT tolerating PO intake, PT will be DC home with follow up to PCP, educated about when to return to the ED if needed. PT verbalizes that he understands all instructions and results. PA NOTE: PT seen resting comfortably in no acute distress, no acute complaints at this time, PT tolerating PO intake, [place] foot rule neg for xray PT will be DC home with follow up to PCP, educated about when to return to the ED if needed. PT verbalizes that he understands all instructions and results.
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
## 2018-03-30 ENCOUNTER — OUTPATIENT (OUTPATIENT)
Dept: OUTPATIENT SERVICES | Facility: HOSPITAL | Age: 23
LOS: 1 days | End: 2018-03-30
Payer: COMMERCIAL

## 2018-03-30 DIAGNOSIS — F20.9 SCHIZOPHRENIA, UNSPECIFIED: ICD-10-CM

## 2018-03-30 PROCEDURE — 85027 COMPLETE CBC AUTOMATED: CPT

## 2018-06-27 ENCOUNTER — OUTPATIENT (OUTPATIENT)
Dept: OUTPATIENT SERVICES | Facility: HOSPITAL | Age: 23
LOS: 1 days | End: 2018-06-27
Payer: COMMERCIAL

## 2018-06-27 DIAGNOSIS — Z00.8 ENCOUNTER FOR OTHER GENERAL EXAMINATION: ICD-10-CM

## 2018-06-27 PROCEDURE — 85027 COMPLETE CBC AUTOMATED: CPT

## 2018-08-01 ENCOUNTER — OUTPATIENT (OUTPATIENT)
Dept: OUTPATIENT SERVICES | Facility: HOSPITAL | Age: 23
LOS: 1 days | End: 2018-08-01
Payer: MEDICAID

## 2018-08-01 PROCEDURE — G9001: CPT

## 2018-08-16 DIAGNOSIS — Z71.89 OTHER SPECIFIED COUNSELING: ICD-10-CM

## 2022-01-05 NOTE — ED BEHAVIORAL HEALTH ASSESSMENT NOTE - NS ED BHA HOMICIDALITY PRESENT AGGRESSION OTHERS PAST MONTH
Called and spoke to patient to relay covid test results as well as explain quarantine. Patient reported no symptoms so I informed the patient, per CDC, their quarantine would be 5 days from test date. Patient stated that their job requires a 10 day quarantine so patient rescheduled covid follow up with Dr. Sewell on 01/10/22 at 9:45a.    None known

## 2022-01-23 PROBLEM — F31.9 BIPOLAR DISORDER, UNSPECIFIED: Chronic | Status: ACTIVE | Noted: 2017-10-18

## 2022-01-23 PROBLEM — F20.9 SCHIZOPHRENIA, UNSPECIFIED: Chronic | Status: ACTIVE | Noted: 2017-10-18

## 2023-02-13 NOTE — ED ADULT NURSE NOTE - PAIN RATING/NUMBER SCALE (0-10): ACTIVITY
Duration Of Freeze Thaw-Cycle (Seconds): 4 Render Post-Care Instructions In Note?: yes Post-Care Instructions: I reviewed with the patient in detail post-care instructions. Patient is to wear sun protection, and avoid picking at any of the treated lesions. Pt may apply Vaseline to crusted or scabbing areas 3 times per day for 7-14 days Detail Level: Generalized Aperture Size (Optional): C Consent: The patient's verbal consent was obtained including but not limited to risks of crusting, scabbing, blistering, scarring, darker or lighter pigmentary change, recurrence, incomplete removal and infection. Number Of Freeze-Thaw Cycles: 1 freeze-thaw cycle Medical Necessity Information: It is in your best interest to select a reason for this procedure from the list below. All of these items fulfill various CMS LCD requirements except the new and changing color options. Consent: The patient's consent was obtained including but not limited to risks of crusting, scabbing, blistering, scarring, darker or lighter pigmentary change, recurrence, incomplete removal and infection. Render Post-Care Instructions In Note?: no Spray Paint Text: The liquid nitrogen was applied to the skin utilizing a spray paint frosting technique. Post-Care Instructions: I reviewed with the patient in detail post-care instructions. Patient is to wear sunprotection, and avoid picking at any of the treated lesions. Pt may apply Vaseline to crusted or scabbing areas. Medical Necessity Clause: This procedure was medically necessary because the lesions that were treated were: 5

## 2024-10-10 ENCOUNTER — NON-APPOINTMENT (OUTPATIENT)
Age: 29
End: 2024-10-10

## 2024-10-12 PROBLEM — Z00.00 ENCOUNTER FOR PREVENTIVE HEALTH EXAMINATION: Status: ACTIVE | Noted: 2024-10-12

## 2024-10-21 ENCOUNTER — APPOINTMENT (OUTPATIENT)
Dept: DERMATOLOGY | Facility: CLINIC | Age: 29
End: 2024-10-21

## 2024-10-30 ENCOUNTER — APPOINTMENT (OUTPATIENT)
Dept: DERMATOLOGY | Facility: CLINIC | Age: 29
End: 2024-10-30

## 2024-11-06 ENCOUNTER — APPOINTMENT (OUTPATIENT)
Dept: DERMATOLOGY | Facility: CLINIC | Age: 29
End: 2024-11-06

## 2024-12-06 ENCOUNTER — APPOINTMENT (OUTPATIENT)
Dept: DERMATOLOGY | Facility: CLINIC | Age: 29
End: 2024-12-06

## 2025-01-07 ENCOUNTER — APPOINTMENT (OUTPATIENT)
Dept: DERMATOLOGY | Facility: CLINIC | Age: 30
End: 2025-01-07
Payer: MEDICARE

## 2025-01-07 PROCEDURE — 99204 OFFICE O/P NEW MOD 45 MIN: CPT

## 2025-04-08 ENCOUNTER — APPOINTMENT (OUTPATIENT)
Dept: DERMATOLOGY | Facility: CLINIC | Age: 30
End: 2025-04-08